# Patient Record
Sex: FEMALE | Employment: UNEMPLOYED | URBAN - METROPOLITAN AREA
[De-identification: names, ages, dates, MRNs, and addresses within clinical notes are randomized per-mention and may not be internally consistent; named-entity substitution may affect disease eponyms.]

---

## 2017-05-08 ENCOUNTER — IP HISTORICAL/CONVERTED ENCOUNTER (OUTPATIENT)
Dept: OTHER | Age: 72
End: 2017-05-08

## 2017-06-13 ENCOUNTER — ED HISTORICAL/CONVERTED ENCOUNTER (OUTPATIENT)
Dept: OTHER | Age: 72
End: 2017-06-13

## 2017-08-28 ENCOUNTER — APPOINTMENT (OUTPATIENT)
Dept: CT IMAGING | Age: 72
End: 2017-08-28
Attending: EMERGENCY MEDICINE
Payer: MEDICARE

## 2017-08-28 ENCOUNTER — APPOINTMENT (OUTPATIENT)
Dept: MRI IMAGING | Age: 72
End: 2017-08-28
Attending: INTERNAL MEDICINE
Payer: MEDICARE

## 2017-08-28 ENCOUNTER — HOSPITAL ENCOUNTER (OUTPATIENT)
Age: 72
Setting detail: OBSERVATION
Discharge: HOME | End: 2017-08-29
Attending: EMERGENCY MEDICINE | Admitting: INTERNAL MEDICINE
Payer: MEDICARE

## 2017-08-28 ENCOUNTER — APPOINTMENT (OUTPATIENT)
Dept: GENERAL RADIOLOGY | Age: 72
End: 2017-08-28
Attending: EMERGENCY MEDICINE
Payer: MEDICARE

## 2017-08-28 DIAGNOSIS — R51.9 NONINTRACTABLE HEADACHE, UNSPECIFIED CHRONICITY PATTERN, UNSPECIFIED HEADACHE TYPE: Primary | ICD-10-CM

## 2017-08-28 DIAGNOSIS — I15.9 SECONDARY HYPERTENSION: ICD-10-CM

## 2017-08-28 DIAGNOSIS — R20.0 NUMBNESS: ICD-10-CM

## 2017-08-28 PROBLEM — K21.9 GERD (GASTROESOPHAGEAL REFLUX DISEASE): Status: ACTIVE | Noted: 2017-08-28

## 2017-08-28 PROBLEM — F32.A DEPRESSION: Status: ACTIVE | Noted: 2017-08-28

## 2017-08-28 PROBLEM — G62.9 PERIPHERAL NEUROPATHY: Status: ACTIVE | Noted: 2017-08-28

## 2017-08-28 LAB
ALBUMIN SERPL-MCNC: 4.1 G/DL (ref 3.5–5)
ALBUMIN/GLOB SERPL: 1.1 {RATIO} (ref 1.1–2.2)
ALP SERPL-CCNC: 115 U/L (ref 45–117)
ALT SERPL-CCNC: 15 U/L (ref 12–78)
ANION GAP SERPL CALC-SCNC: 6 MMOL/L (ref 5–15)
APPEARANCE UR: CLEAR
AST SERPL-CCNC: 14 U/L (ref 15–37)
BACTERIA URNS QL MICRO: NEGATIVE /HPF
BASOPHILS # BLD: 0.1 K/UL (ref 0–0.1)
BASOPHILS NFR BLD: 1 % (ref 0–1)
BILIRUB SERPL-MCNC: 0.3 MG/DL (ref 0.2–1)
BILIRUB UR QL: NEGATIVE
BUN SERPL-MCNC: 11 MG/DL (ref 6–20)
BUN/CREAT SERPL: 13 (ref 12–20)
CALCIUM SERPL-MCNC: 9.1 MG/DL (ref 8.5–10.1)
CHLORIDE SERPL-SCNC: 104 MMOL/L (ref 97–108)
CK MB CFR SERPL CALC: 1.5 % (ref 0–2.5)
CK MB SERPL-MCNC: 2.5 NG/ML (ref 5–25)
CK SERPL-CCNC: 163 U/L (ref 26–192)
CO2 SERPL-SCNC: 30 MMOL/L (ref 21–32)
COLOR UR: NORMAL
CREAT SERPL-MCNC: 0.85 MG/DL (ref 0.55–1.02)
EOSINOPHIL # BLD: 0.3 K/UL (ref 0–0.4)
EOSINOPHIL NFR BLD: 4 % (ref 0–7)
EPITH CASTS URNS QL MICRO: NORMAL /LPF
ERYTHROCYTE [DISTWIDTH] IN BLOOD BY AUTOMATED COUNT: 14.3 % (ref 11.5–14.5)
ERYTHROCYTE [SEDIMENTATION RATE] IN BLOOD: 9 MM/HR (ref 0–30)
GLOBULIN SER CALC-MCNC: 3.6 G/DL (ref 2–4)
GLUCOSE BLD STRIP.AUTO-MCNC: 106 MG/DL (ref 65–100)
GLUCOSE SERPL-MCNC: 92 MG/DL (ref 65–100)
GLUCOSE UR STRIP.AUTO-MCNC: NEGATIVE MG/DL
HCT VFR BLD AUTO: 42.3 % (ref 35–47)
HGB BLD-MCNC: 13.8 G/DL (ref 11.5–16)
HGB UR QL STRIP: NEGATIVE
HYALINE CASTS URNS QL MICRO: NORMAL /LPF (ref 0–5)
INR PPP: 1 (ref 0.9–1.1)
KETONES UR QL STRIP.AUTO: NEGATIVE MG/DL
LEUKOCYTE ESTERASE UR QL STRIP.AUTO: NEGATIVE
LIPASE SERPL-CCNC: 236 U/L (ref 73–393)
LYMPHOCYTES # BLD: 2.6 K/UL (ref 0.8–3.5)
LYMPHOCYTES NFR BLD: 33 % (ref 12–49)
MCH RBC QN AUTO: 30.3 PG (ref 26–34)
MCHC RBC AUTO-ENTMCNC: 32.6 G/DL (ref 30–36.5)
MCV RBC AUTO: 92.8 FL (ref 80–99)
MONOCYTES # BLD: 0.6 K/UL (ref 0–1)
MONOCYTES NFR BLD: 7 % (ref 5–13)
NEUTS SEG # BLD: 4.5 K/UL (ref 1.8–8)
NEUTS SEG NFR BLD: 55 % (ref 32–75)
NITRITE UR QL STRIP.AUTO: NEGATIVE
PH UR STRIP: 7 [PH] (ref 5–8)
PLATELET # BLD AUTO: 263 K/UL (ref 150–400)
POTASSIUM SERPL-SCNC: 4.1 MMOL/L (ref 3.5–5.1)
PROT SERPL-MCNC: 7.7 G/DL (ref 6.4–8.2)
PROT UR STRIP-MCNC: NEGATIVE MG/DL
PROTHROMBIN TIME: 10.5 SEC (ref 9–11.1)
RBC # BLD AUTO: 4.56 M/UL (ref 3.8–5.2)
RBC #/AREA URNS HPF: NORMAL /HPF (ref 0–5)
SERVICE CMNT-IMP: ABNORMAL
SODIUM SERPL-SCNC: 140 MMOL/L (ref 136–145)
SP GR UR REFRACTOMETRY: 1 (ref 1–1.03)
TROPONIN I SERPL-MCNC: <0.04 NG/ML
UROBILINOGEN UR QL STRIP.AUTO: 0.2 EU/DL (ref 0.2–1)
WBC # BLD AUTO: 8 K/UL (ref 3.6–11)
WBC URNS QL MICRO: NORMAL /HPF (ref 0–4)

## 2017-08-28 PROCEDURE — 84484 ASSAY OF TROPONIN QUANT: CPT | Performed by: EMERGENCY MEDICINE

## 2017-08-28 PROCEDURE — 82550 ASSAY OF CK (CPK): CPT | Performed by: EMERGENCY MEDICINE

## 2017-08-28 PROCEDURE — 96374 THER/PROPH/DIAG INJ IV PUSH: CPT

## 2017-08-28 PROCEDURE — 96361 HYDRATE IV INFUSION ADD-ON: CPT

## 2017-08-28 PROCEDURE — 70544 MR ANGIOGRAPHY HEAD W/O DYE: CPT

## 2017-08-28 PROCEDURE — 99285 EMERGENCY DEPT VISIT HI MDM: CPT

## 2017-08-28 PROCEDURE — 74011250636 HC RX REV CODE- 250/636: Performed by: EMERGENCY MEDICINE

## 2017-08-28 PROCEDURE — 85610 PROTHROMBIN TIME: CPT | Performed by: EMERGENCY MEDICINE

## 2017-08-28 PROCEDURE — 85652 RBC SED RATE AUTOMATED: CPT | Performed by: EMERGENCY MEDICINE

## 2017-08-28 PROCEDURE — 70551 MRI BRAIN STEM W/O DYE: CPT

## 2017-08-28 PROCEDURE — 80053 COMPREHEN METABOLIC PANEL: CPT | Performed by: EMERGENCY MEDICINE

## 2017-08-28 PROCEDURE — 70450 CT HEAD/BRAIN W/O DYE: CPT

## 2017-08-28 PROCEDURE — 83690 ASSAY OF LIPASE: CPT | Performed by: EMERGENCY MEDICINE

## 2017-08-28 PROCEDURE — 93005 ELECTROCARDIOGRAM TRACING: CPT

## 2017-08-28 PROCEDURE — 99218 HC RM OBSERVATION: CPT

## 2017-08-28 PROCEDURE — 96375 TX/PRO/DX INJ NEW DRUG ADDON: CPT

## 2017-08-28 PROCEDURE — 36415 COLL VENOUS BLD VENIPUNCTURE: CPT | Performed by: EMERGENCY MEDICINE

## 2017-08-28 PROCEDURE — 85025 COMPLETE CBC W/AUTO DIFF WBC: CPT | Performed by: EMERGENCY MEDICINE

## 2017-08-28 PROCEDURE — 71010 XR CHEST PORT: CPT

## 2017-08-28 PROCEDURE — 81001 URINALYSIS AUTO W/SCOPE: CPT | Performed by: EMERGENCY MEDICINE

## 2017-08-28 PROCEDURE — 82962 GLUCOSE BLOOD TEST: CPT

## 2017-08-28 RX ORDER — BUTALBITAL, ACETAMINOPHEN AND CAFFEINE 50; 325; 40 MG/1; MG/1; MG/1
1 TABLET ORAL
COMMUNITY

## 2017-08-28 RX ORDER — SODIUM CHLORIDE 0.9 % (FLUSH) 0.9 %
5-10 SYRINGE (ML) INJECTION AS NEEDED
Status: DISCONTINUED | OUTPATIENT
Start: 2017-08-28 | End: 2017-08-29 | Stop reason: HOSPADM

## 2017-08-28 RX ORDER — PANTOPRAZOLE SODIUM 40 MG/1
40 TABLET, DELAYED RELEASE ORAL DAILY
Status: DISCONTINUED | OUTPATIENT
Start: 2017-08-29 | End: 2017-08-29 | Stop reason: HOSPADM

## 2017-08-28 RX ORDER — GUAIFENESIN 100 MG/5ML
81 LIQUID (ML) ORAL DAILY
Status: DISCONTINUED | OUTPATIENT
Start: 2017-08-29 | End: 2017-08-29 | Stop reason: HOSPADM

## 2017-08-28 RX ORDER — GABAPENTIN 300 MG/1
300 CAPSULE ORAL EVERY EVENING
COMMUNITY

## 2017-08-28 RX ORDER — KETOROLAC TROMETHAMINE 30 MG/ML
15 INJECTION, SOLUTION INTRAMUSCULAR; INTRAVENOUS
Status: COMPLETED | OUTPATIENT
Start: 2017-08-28 | End: 2017-08-28

## 2017-08-28 RX ORDER — ESCITALOPRAM OXALATE 5 MG/1
5 TABLET ORAL EVERY EVENING
COMMUNITY

## 2017-08-28 RX ORDER — ASPIRIN 81 MG/1
81 TABLET ORAL EVERY EVENING
COMMUNITY

## 2017-08-28 RX ORDER — ENOXAPARIN SODIUM 100 MG/ML
40 INJECTION SUBCUTANEOUS EVERY 24 HOURS
Status: DISCONTINUED | OUTPATIENT
Start: 2017-08-28 | End: 2017-08-29 | Stop reason: HOSPADM

## 2017-08-28 RX ORDER — PROCHLORPERAZINE EDISYLATE 5 MG/ML
10 INJECTION INTRAMUSCULAR; INTRAVENOUS
Status: COMPLETED | OUTPATIENT
Start: 2017-08-28 | End: 2017-08-28

## 2017-08-28 RX ORDER — DIPHENHYDRAMINE HYDROCHLORIDE 50 MG/ML
25 INJECTION, SOLUTION INTRAMUSCULAR; INTRAVENOUS
Status: COMPLETED | OUTPATIENT
Start: 2017-08-28 | End: 2017-08-28

## 2017-08-28 RX ORDER — OMEPRAZOLE 20 MG/1
20 CAPSULE, DELAYED RELEASE ORAL EVERY EVENING
Status: DISCONTINUED | OUTPATIENT
Start: 2017-08-29 | End: 2017-08-28 | Stop reason: CLARIF

## 2017-08-28 RX ORDER — AMOXICILLIN 250 MG
1 CAPSULE ORAL DAILY
Status: DISCONTINUED | OUTPATIENT
Start: 2017-08-29 | End: 2017-08-29 | Stop reason: HOSPADM

## 2017-08-28 RX ORDER — GABAPENTIN 300 MG/1
300 CAPSULE ORAL EVERY EVENING
Status: DISCONTINUED | OUTPATIENT
Start: 2017-08-29 | End: 2017-08-29 | Stop reason: HOSPADM

## 2017-08-28 RX ORDER — ONDANSETRON 2 MG/ML
4 INJECTION INTRAMUSCULAR; INTRAVENOUS
Status: DISCONTINUED | OUTPATIENT
Start: 2017-08-28 | End: 2017-08-29 | Stop reason: HOSPADM

## 2017-08-28 RX ORDER — SODIUM CHLORIDE 0.9 % (FLUSH) 0.9 %
5-10 SYRINGE (ML) INJECTION EVERY 8 HOURS
Status: DISCONTINUED | OUTPATIENT
Start: 2017-08-28 | End: 2017-08-29 | Stop reason: HOSPADM

## 2017-08-28 RX ORDER — ACETAMINOPHEN 650 MG/1
650 SUPPOSITORY RECTAL
Status: DISCONTINUED | OUTPATIENT
Start: 2017-08-28 | End: 2017-08-29 | Stop reason: HOSPADM

## 2017-08-28 RX ORDER — ESCITALOPRAM OXALATE 10 MG/1
5 TABLET ORAL EVERY EVENING
Status: DISCONTINUED | OUTPATIENT
Start: 2017-08-29 | End: 2017-08-29 | Stop reason: HOSPADM

## 2017-08-28 RX ORDER — OMEPRAZOLE 20 MG/1
20 CAPSULE, DELAYED RELEASE ORAL EVERY EVENING
COMMUNITY

## 2017-08-28 RX ORDER — DEXAMETHASONE SODIUM PHOSPHATE 4 MG/ML
10 INJECTION, SOLUTION INTRA-ARTICULAR; INTRALESIONAL; INTRAMUSCULAR; INTRAVENOUS; SOFT TISSUE
Status: COMPLETED | OUTPATIENT
Start: 2017-08-28 | End: 2017-08-28

## 2017-08-28 RX ORDER — ACETAMINOPHEN 325 MG/1
650 TABLET ORAL
Status: DISCONTINUED | OUTPATIENT
Start: 2017-08-28 | End: 2017-08-29 | Stop reason: HOSPADM

## 2017-08-28 RX ORDER — KETOROLAC TROMETHAMINE 30 MG/ML
15 INJECTION, SOLUTION INTRAMUSCULAR; INTRAVENOUS
Status: DISCONTINUED | OUTPATIENT
Start: 2017-08-28 | End: 2017-08-29 | Stop reason: HOSPADM

## 2017-08-28 RX ADMIN — SODIUM CHLORIDE 1000 ML: 900 INJECTION, SOLUTION INTRAVENOUS at 18:28

## 2017-08-28 RX ADMIN — PROCHLORPERAZINE EDISYLATE 10 MG: 5 INJECTION INTRAMUSCULAR; INTRAVENOUS at 18:25

## 2017-08-28 RX ADMIN — DIPHENHYDRAMINE HYDROCHLORIDE 25 MG: 50 INJECTION, SOLUTION INTRAMUSCULAR; INTRAVENOUS at 18:25

## 2017-08-28 RX ADMIN — KETOROLAC TROMETHAMINE 15 MG: 30 INJECTION, SOLUTION INTRAMUSCULAR at 18:22

## 2017-08-28 RX ADMIN — DEXAMETHASONE SODIUM PHOSPHATE 10 MG: 4 INJECTION, SOLUTION INTRAMUSCULAR; INTRAVENOUS at 18:26

## 2017-08-28 NOTE — PROGRESS NOTES
[facility]: MED RECONCILIATION      Information obtained from: Patient       Allergies: Tetanus vaccines and toxoid    Prior to Admission Medications:     Medication Documentation Review Audit       Reviewed by [address], PHARMD (Pharmacist) on 08/28/17 at [address] [place] [address] Provider Last Dose Status Taking?      aspirin delayed-release 81 mg tablet Take 81 mg by mouth every evening. Historical Provider 8/27/2017 Active Yes    butalbital-acetaminophen-caffeine (FIORICET, ESGIC) -40 mg per tablet Take 1 Tab by mouth every six (6) hours as needed for Headache. Historical Provider 8/27/2017 Active Yes    escitalopram oxalate (LEXAPRO) 5 mg tablet Take 5 mg by mouth every evening. Historical Provider 8/27/2017 Active Yes    fish oil-omega-3 fatty acids (FISH OIL) 340-1,000 mg capsule Take 1 Cap by mouth every evening. Historical Provider 8/27/2017 Active Yes    gabapentin (NEURONTIN) 300 mg capsule Take 300 mg by mouth every evening. Historical Provider 8/27/2017 Active Yes    omeprazole (PRILOSEC) 20 mg capsule Take 20 mg by mouth every evening.  Historical Provider 8/27/2017 Active Yes                        [address], PHARMD   Contact: [phone]

## 2017-08-28 NOTE — IP AVS SNAPSHOT
303 [facility] 
 
 
 5[address] [facility] 70 [facility] 
[phone] Patient: Vin Melvin MRN: [id number] :1945 Current Discharge Medication List  
  
START taking these medications Dose & Instructions Dispensing Information Comments Morning Noon Evening Bedtime  
 atorvastatin 40 mg tablet Commonly known as:  LIPITOR Your last dose was: Your next dose is:    
   
   
 Dose:  40 mg Take 1 Tab by mouth daily for 90 days. Quantity:  30 Tab Refills:  2  
     
   
   
   
  
 predniSONE 10 mg tablet Commonly known as:  Izzy Ada Your last dose was: Your next dose is: Take 6 tabs for 1 day, then 5 tabs for 1 day, then 4 tabs for 1 day, then 3 tabs for 1 day, then 2 tabs for 1 day, then 1 tab for 1 day. Quantity:  21 Tab Refills:  0 CONTINUE these medications which have NOT CHANGED Dose & Instructions Dispensing Information Comments Morning Noon Evening Bedtime  
 aspirin delayed-release 81 mg tablet Your last dose was: Your next dose is:    
   
   
 Dose:  81 mg Take 81 mg by mouth every evening. Refills:  0  
     
   
   
   
  
 butalbital-acetaminophen-caffeine -40 mg per tablet Commonly known as:  Catherne Saras Your last dose was: Your next dose is:    
   
   
 Dose:  1 Tab Take 1 Tab by mouth every six (6) hours as needed for Headache. Refills:  0  
     
   
   
   
  
 FISH -1,000 mg capsule Generic drug:  fish oil-omega-3 fatty acids Your last dose was: Your next dose is:    
   
   
 Dose:  1 Cap Take 1 Cap by mouth every evening. Refills:  0  
     
   
   
   
  
 gabapentin 300 mg capsule Commonly known as:  NEURONTIN Your last dose was: Your next dose is:    
   
   
 Dose:  300 mg Take 300 mg by mouth every evening. Refills:  0 LEXAPRO 5 mg tablet Generic drug:  escitalopram oxalate Your last dose was: Your next dose is:    
   
   
 Dose:  5 mg Take 5 mg by mouth every evening. Refills:  0  
     
   
   
   
  
 omeprazole 20 mg capsule Commonly known as:  PRILOSEC Your last dose was: Your next dose is:    
   
   
 Dose:  20 mg Take 20 mg by mouth every evening. Refills:  0 Where to Get Your Medications Information on where to get these meds will be given to you by the nurse or doctor. ! Ask your nurse or doctor about these medications  
  atorvastatin 40 mg tablet  
 predniSONE 10 mg tablet

## 2017-08-28 NOTE — ED NOTES
Bedside and Verbal shift change report given to Lor Conrad RN (oncoming nurse) by Arleth Gonzalez RN (offgoing nurse). Report included the following information SBAR, ED Summary, MAR and Recent Results.

## 2017-08-28 NOTE — IP AVS SNAPSHOT
[address] 
[phone] Patient: Corby Pinzon MRN: [id number] :1945 You are allergic to the following Allergen Reactions Tetanus Vaccines And Toxoid Swelling \"Whole arm swelled up\". Reaction happened years ago per patient. Recent Documentation Height Weight BMI  
  
  
 1.575 m 63.5 kg 25.61 kg/m2 Emergency Contacts Name Discharge Info Relation Home Work Mobile Gera Motley DISCHARGE CAREGIVER [3] Spouse [3] [phone] About your hospitalization You were admitted on:  2017 You last received care in the:  [facility] 5M1 MED SURG 1 You were discharged on:  2017 Unit phone number:  [phone] Why you were hospitalized Your primary diagnosis was:  Cervicalgia Your diagnoses also included:  Facial Numbness, Gerd (Gastroesophageal Reflux Disease), Peripheral Neuropathy (Hcc), Depression, Hyperlipidemia, Neuropathy (Hcc), Radiculopathy Providers Seen During Your Hospitalizations Provider Role Specialty Primary office phone Shante Ladd MD Attending Provider Emergency Medicine [phone] Emmanuelle Hernandez DO Attending Provider Internal Medicine [phone] Duyen Espinosa MD Attending Provider Internal Medicine [phone] Your Primary Care Physician (PCP) Primary Care Physician [facility] [phone] Follow-up Information Follow up With Details Comments Contact Info Lidia  MD Jameson Schedule an appointment as soon as possible for a visit in 3 weeks  [address] [place] [address] 
[phone] Charlene Saini NP Schedule an appointment as soon as possible for a visit in 2 weeks If symptoms worsen, with Neurology NP if CHEEMA continues.   Press option 4 on telephone to schedule appt, state you are a hospital follow-up pt and have insurance card to schedule appt [address] [place] [address] 
[phone] Current Discharge Medication List  
  
START taking these medications Dose & Instructions Dispensing Information Comments Morning Noon Evening Bedtime  
 atorvastatin 40 mg tablet Commonly known as:  LIPITOR Your last dose was: Your next dose is:    
   
   
 Dose:  40 mg Take 1 Tab by mouth daily for 90 days. Quantity:  30 Tab Refills:  2  
     
   
   
   
  
 predniSONE 10 mg tablet Commonly known as:  Jen Humcherisebird Your last dose was: Your next dose is: Take 6 tabs for 1 day, then 5 tabs for 1 day, then 4 tabs for 1 day, then 3 tabs for 1 day, then 2 tabs for 1 day, then 1 tab for 1 day. Quantity:  21 Tab Refills:  0 CONTINUE these medications which have NOT CHANGED Dose & Instructions Dispensing Information Comments Morning Noon Evening Bedtime  
 aspirin delayed-release 81 mg tablet Your last dose was: Your next dose is:    
   
   
 Dose:  81 mg Take 81 mg by mouth every evening. Refills:  0  
     
   
   
   
  
 butalbital-acetaminophen-caffeine -40 mg per tablet Commonly known as:  Vernette Agent Your last dose was: Your next dose is:    
   
   
 Dose:  1 Tab Take 1 Tab by mouth every six (6) hours as needed for Headache. Refills:  0  
     
   
   
   
  
 FISH -1,000 mg capsule Generic drug:  fish oil-omega-3 fatty acids Your last dose was: Your next dose is:    
   
   
 Dose:  1 Cap Take 1 Cap by mouth every evening. Refills:  0  
     
   
   
   
  
 gabapentin 300 mg capsule Commonly known as:  NEURONTIN Your last dose was: Your next dose is:    
   
   
 Dose:  300 mg Take 300 mg by mouth every evening. Refills:  0 LEXAPRO 5 mg tablet Generic drug:  escitalopram oxalate Your last dose was: Your next dose is:    
   
   
 Dose:  5 mg Take 5 mg by mouth every evening. Refills:  0  
     
   
   
   
  
 omeprazole 20 mg capsule Commonly known as:  PRILOSEC Your last dose was: Your next dose is:    
   
   
 Dose:  20 mg Take 20 mg by mouth every evening. Refills:  0 Where to Get Your Medications Information on where to get these meds will be given to you by the nurse or doctor. ! Ask your nurse or doctor about these medications  
  atorvastatin 40 mg tablet  
 predniSONE 10 mg tablet Discharge Instructions Patient Discharge Instructions Donna Hinojosadra / [id number] : 1945 Admitted 2017 Discharged: 2017 Primary Diagnoses Problem List as of 2017  Date Reviewed: 2017 Codes Class Noted - Resolved Hyperlipidemia Neuropathy ([facility] * (Principal)Cervicalgia Radiculopathy Facial numbness GERD (gastroesophageal reflux disease) Peripheral neuropathy ([facility] Depression Take Home Medications · It is important that you take the medication exactly as they are prescribed. · Keep your medication in the bottles provided by the pharmacist and keep a list of the medication names, dosages, and times to be taken in your wallet. · Do not take other medications without consulting your doctor. What to do at [facility] Recommended diet: Cardiac Diet and Low fat, Low cholesterol Recommended activity: Activity as tolerated If you experience worse symptoms, please follow up with your PCP or neurologist. 
 
Follow-up with your PCP in a few weeks Cholesterol and Triglycerides Tests: About These Tests What are they? Cholesterol and triglycerides tests measure the amount of fats in your blood. These fats have both \"good\" (HDL) and \"bad\" (LDL) cholesterol. Why are these tests done? These tests are done to help find out your risk of a heart attack and stroke. They can help your doctor find out how well medicine is working for some health problems. How can you prepare for these tests? · Your doctor may tell you to fast before your tests. This means that you do not eat or drink anything except water for 9 to 12 hours before the tests. In most cases, you can take your medicines with water the morning of the test. 
· Do not eat high-fat foods the night before the tests. · Do not drink alcohol or do intense exercise the night before the tests. · Be sure to tell your doctor about all the over-the-counter and prescription medicines and herbs or other supplements you take. They can affect the results of these tests. What happens during these tests? A health professional takes a sample of your blood. What else should you know about these tests? Your cholesterol levels can help your doctor find out your risk for having a heart attack or stroke. But it's not just about your cholesterol. Your doctor uses your cholesterol levels plus other things to calculate your risk. These include: 
· Your blood pressure. · Whether or not you have diabetes. · Your age, sex, and race. · Whether or not you smoke. You and your doctor can talk about whether you need to lower your risk and what treatment is best for you. Where can you learn more? Go to http://blanca-tristen.info/. Enter I387 in the search box to learn more about \"Cholesterol and Triglycerides Tests: About These Tests. \" Current as of: April 3, 2017 Content Version: 11.3 © 8558-4346 [employer]. Care instructions adapted under license by [employer] (which disclaims liability or warranty for this information).  If you have questions about a medical condition or this instruction, always ask your healthcare professional. [employer] disclaims any warranty or liability for your use of this information. Information obtained by : 
I understand that if any problems occur once I am at home I am to contact my physician. I understand and acknowledge receipt of the instructions indicated above. Physician's or R.N.'s Signature                                                                  Date/Time Patient or Representative Signature                                                          Date/Time Discharge Orders None [employer] Announcement We are excited to announce that we are making your provider's discharge notes available to you in [employer]. You will see these notes when they are completed and signed by the physician that discharged you from your recent hospital stay. If you have any questions or concerns about any information you see in [employer], please call the Health Information Department where you were seen or reach out to your Primary Care Provider for more information about your plan of care. Introducing [facility] SERVICES! Yon Fam introduces [employer] patient portal. Now you can access parts of your medical record, email your doctor's office, and request medication refills online. 1. In your internet browser, go to [email or web address] 2. Click on the First Time User? Click Here link in the Sign In box. You will see the New Member Sign Up page. 3. Enter your [employer] Access Code exactly as it appears below. You will not need to use this code after youve completed the sign-up process. If you do not sign up before the expiration date, you must request a new code. · [employer] Access Code: [email or web address] Expires: 11/27/2017  3:44 PM 
 
4. Enter the last four digits of your Social Security Number (xxxx) and Date of Birth (mm/dd/yyyy) as indicated and click Submit. You will be taken to the next sign-up page. 5. Create a [employer] ID. This will be your [employer] login ID and cannot be changed, so think of one that is secure and easy to remember. 6. Create a [employer] password. You can change your password at any time. 7. Enter your Password Reset Question and Answer. This can be used at a later time if you forget your password. 8. Enter your e-mail address. You will receive e-mail notification when new information is available in [address] 9. Click Sign Up. You can now view and download portions of your medical record. 10. Click the Download Summary menu link to download a portable copy of your medical information. If you have questions, please visit the Frequently Asked Questions section of the [employer] website. Remember, [employer] is NOT to be used for urgent needs. For medical emergencies, dial 911. Now available from your iPhone and Android! General Information Please provide this summary of care documentation to your next provider. Patient Signature:  ____________________________________________________________ Date:  ____________________________________________________________  
  
Claudene Born Provider Signature:  ____________________________________________________________ Date:  ____________________________________________________________

## 2017-08-28 NOTE — ED PROVIDER NOTES
HPI Comments: 67 y.o. female with no significant past medical history who presents from home via private vehilce with chief complaint of HA and facial numbness. Pt reports that she has had daily headaches for the past month that gradually have worsened in severity over the past 2 days and then today around 3:45 she developed diffuse facial numbness that prompted her to come to the ED. She has not taken any medications to day for her Sx. Pt denies focal weakness, vision changes, speech changes, facial asymmetry, fever, chills, nausea, vomiting, diarrhea, abdominal pain, CP, SOB. There are no other acute medical concerns at this time. PCP: Ninoska Sanchez MD  Note written by hetal Del Toro, as dictated by José Busby MD 6:47 PM        The history is provided by the patient. No past medical history on file. No past surgical history on file. No family history on file. Social History     Social History    Marital status: N/A     Spouse name: N/A    Number of children: N/A    Years of education: N/A     Occupational History    Not on file. Social History Main Topics    Smoking status: Not on file    Smokeless tobacco: Not on file    Alcohol use Not on file    Drug use: Not on file    Sexual activity: Not on file     Other Topics Concern    Not on file     Social History Narrative         ALLERGIES: Review of patient's allergies indicates no known allergies. Review of Systems   Constitutional: Negative for chills and fever. HENT: Negative for congestion, rhinorrhea, sneezing and sore throat. Eyes: Negative for redness and visual disturbance. Respiratory: Negative for shortness of breath. Cardiovascular: Negative for chest pain and leg swelling. Gastrointestinal: Negative for abdominal pain, nausea and vomiting. Genitourinary: Negative for difficulty urinating and frequency. Musculoskeletal: Negative for back pain, myalgias and neck stiffness.    Skin: Negative for rash. Neurological: Positive for numbness and headaches. Negative for dizziness, syncope and weakness. Hematological: Negative for adenopathy. All other systems reviewed and are negative. Vitals:    08/28/17 1739   BP: (!) 204/92   Pulse: 60   Resp: 16   Temp: 98.2 °F (36.8 °C)   SpO2: 97%   Weight: 63.5 kg (140 lb)   Height: 5' 2\" (1.575 m)            Physical Exam   Constitutional: She is oriented to person, place, and time. She appears well-developed and well-nourished. NAD, AxOx4, speaking in complete sentences, gcs = 15     HENT:   Head: Normocephalic and atraumatic. Nose: Nose normal.   Mouth/Throat: Oropharynx is clear and moist.   Cn intact    No facial droop/ slurred speech/ tongue deviation    'cheeks are numb (bilat)'   Eyes: Conjunctivae and EOM are normal. Pupils are equal, round, and reactive to light. Right eye exhibits no discharge. Left eye exhibits no discharge. No scleral icterus. Neck: Normal range of motion. Neck supple. No JVD present. No tracheal deviation present. Cardiovascular: Normal rate, regular rhythm, normal heart sounds and intact distal pulses. Exam reveals no gallop and no friction rub. No murmur heard. Pulmonary/Chest: Effort normal and breath sounds normal. No respiratory distress. She has no wheezes. She has no rales. She exhibits no tenderness. Abdominal: Soft. Bowel sounds are normal. There is no tenderness. There is no rebound and no guarding. nttp     Genitourinary: No vaginal discharge found. Genitourinary Comments: Pt denies urinary/ vaginal complaints   Musculoskeletal: Normal range of motion. She exhibits no edema, tenderness or deformity. Neurological: She is alert and oriented to person, place, and time. She has normal reflexes. No cranial nerve deficit. She exhibits normal muscle tone. Coordination normal.   pt has motor/ CV/ Sensation grossly intact to all extremities, R = L in strength;   Skin: Skin is warm and dry.  No rash noted. No erythema. No pallor. Psychiatric: She has a normal mood and affect. Her behavior is normal. Thought content normal.   Nursing note and vitals reviewed. MDM  ED Course       Procedures     Chief Complaint   Patient presents with    Headache    Numbness       7:23 PM  The patients presenting problems have been discussed, and they are in agreement with the care plan formulated and outlined with them. I have encouraged them to ask questions as they arise throughout their visit. MEDICATIONS GIVEN:  Medications   sodium chloride 0.9 % bolus infusion 1,000 mL (1,000 mL IntraVENous New Bag 8/28/17 1828)   ketorolac (TORADOL) injection 15 mg (15 mg IntraVENous Given 8/28/17 1822)   prochlorperazine (COMPAZINE) injection 10 mg (10 mg IntraVENous Given 8/28/17 1825)   diphenhydrAMINE (BENADRYL) injection 25 mg (25 mg IntraVENous Given 8/28/17 1825)   dexamethasone (DECADRON) 4 mg/mL injection 10 mg (10 mg IntraVENous Given 8/28/17 1826)       LABS REVIEWED:  Labs Reviewed   METABOLIC PANEL, COMPREHENSIVE - Abnormal; Notable for the following:        Result Value    AST (SGOT) 14 (*)     All other components within normal limits   GLUCOSE, POC - Abnormal; Notable for the following:     Glucose (POC) 106 (*)     All other components within normal limits   TROPONIN I   PROTHROMBIN TIME + INR   URINALYSIS W/MICROSCOPIC   LIPASE   CK W/ CKMB & INDEX   CBC WITH AUTOMATED DIFF   SED RATE (ESR)   SAMPLES BEING HELD   POC GLUCOSE       RADIOLOGY RESULTS:  The following have been ordered and reviewed:  _____________________________________________________________________  _____________________________________________________________________    EKG interpretation: (Preliminary)  Rhythm: normal sinus rhythm; and regular .  Rate (approx.): 62; Axis: normal; P wave: normal; QRS interval: normal ; ST/T wave: normal; Negative acute significant segmental elevations    PROCEDURES:        CONSULTATIONS:       PROGRESS NOTES:      DIAGNOSIS:    1. Nonintractable headache, unspecified chronicity pattern, unspecified headache type    2. Numbness        PLAN:  1- HA/ facial numbness/ HA improved; plan to have evaluated for admission and obtain MRI/ MRA brain; Pt/  agrees;       ED COURSE: The patients hospital course has been uncomplicated. CONSULT NOTE:  5:52 PM Homar Moreno MD spoke with Dr. Jackelin Park, Consult for Neurology. Discussed available diagnostic tests and clinical findings. He is in agreement with care plans as outlined. Dr. Jackelin Park recommends MRA/MRI of the head and to obtain a sed rate. 7:24 PM  'doing better now';       7:30 PM  Patient is being evaluated for admission to the hospital by the hospitalist, Dr Amara Bruno . The results of their tests and reasons for their admission have been discussed with them and/or available family. They convey agreement and understanding for the need to be admitted and for their admission diagnosis. Consultation has been made with the inpatient physician specialist for hospitalization.

## 2017-08-28 NOTE — ED TRIAGE NOTES
Pt states facial numbness since 1530 today. Both cheeks feel numb. Pt speech is clear. No facial droop noted. Moves x 4 with equal hand grasps. C/o top of head aches.

## 2017-08-29 VITALS
SYSTOLIC BLOOD PRESSURE: 137 MMHG | WEIGHT: 140 LBS | OXYGEN SATURATION: 95 % | BODY MASS INDEX: 25.76 KG/M2 | HEART RATE: 58 BPM | HEIGHT: 62 IN | TEMPERATURE: 98 F | DIASTOLIC BLOOD PRESSURE: 80 MMHG | RESPIRATION RATE: 18 BRPM

## 2017-08-29 PROBLEM — M54.10 RADICULOPATHY: Status: ACTIVE | Noted: 2017-08-29

## 2017-08-29 PROBLEM — M54.2 CERVICALGIA: Status: ACTIVE | Noted: 2017-08-29

## 2017-08-29 LAB
ATRIAL RATE: 61 BPM
CALCULATED P AXIS, ECG09: 74 DEGREES
CALCULATED R AXIS, ECG10: 3 DEGREES
CALCULATED T AXIS, ECG11: 61 DEGREES
CHOLEST SERPL-MCNC: 294 MG/DL
DIAGNOSIS, 93000: NORMAL
EST. AVERAGE GLUCOSE BLD GHB EST-MCNC: 117 MG/DL
HBA1C MFR BLD: 5.7 % (ref 4.2–6.3)
HDLC SERPL-MCNC: 55 MG/DL
HDLC SERPL: 5.3 {RATIO} (ref 0–5)
LDLC SERPL CALC-MCNC: 222.8 MG/DL (ref 0–100)
LIPID PROFILE,FLP: ABNORMAL
P-R INTERVAL, ECG05: 138 MS
Q-T INTERVAL, ECG07: 444 MS
QRS DURATION, ECG06: 74 MS
QTC CALCULATION (BEZET), ECG08: 446 MS
T4 FREE SERPL-MCNC: 0.9 NG/DL (ref 0.8–1.5)
TRIGL SERPL-MCNC: 81 MG/DL (ref ?–150)
TSH SERPL DL<=0.05 MIU/L-ACNC: 0.84 UIU/ML (ref 0.36–3.74)
VENTRICULAR RATE, ECG03: 61 BPM
VLDLC SERPL CALC-MCNC: 16.2 MG/DL

## 2017-08-29 PROCEDURE — 80061 LIPID PANEL: CPT | Performed by: INTERNAL MEDICINE

## 2017-08-29 PROCEDURE — 74011000250 HC RX REV CODE- 250: Performed by: NURSE PRACTITIONER

## 2017-08-29 PROCEDURE — 74011636637 HC RX REV CODE- 636/637: Performed by: NURSE PRACTITIONER

## 2017-08-29 PROCEDURE — 93306 TTE W/DOPPLER COMPLETE: CPT

## 2017-08-29 PROCEDURE — 96372 THER/PROPH/DIAG INJ SC/IM: CPT

## 2017-08-29 PROCEDURE — 96365 THER/PROPH/DIAG IV INF INIT: CPT

## 2017-08-29 PROCEDURE — 74011250637 HC RX REV CODE- 250/637: Performed by: INTERNAL MEDICINE

## 2017-08-29 PROCEDURE — 84443 ASSAY THYROID STIM HORMONE: CPT | Performed by: INTERNAL MEDICINE

## 2017-08-29 PROCEDURE — 83036 HEMOGLOBIN GLYCOSYLATED A1C: CPT | Performed by: INTERNAL MEDICINE

## 2017-08-29 PROCEDURE — 36415 COLL VENOUS BLD VENIPUNCTURE: CPT | Performed by: INTERNAL MEDICINE

## 2017-08-29 PROCEDURE — 84439 ASSAY OF FREE THYROXINE: CPT | Performed by: INTERNAL MEDICINE

## 2017-08-29 PROCEDURE — 74011000258 HC RX REV CODE- 258: Performed by: NURSE PRACTITIONER

## 2017-08-29 PROCEDURE — 99218 HC RM OBSERVATION: CPT

## 2017-08-29 PROCEDURE — 74011250636 HC RX REV CODE- 250/636: Performed by: INTERNAL MEDICINE

## 2017-08-29 PROCEDURE — A9270 NON-COVERED ITEM OR SERVICE: HCPCS | Performed by: NURSE PRACTITIONER

## 2017-08-29 RX ORDER — PREDNISONE 20 MG/1
40 TABLET ORAL
Status: DISCONTINUED | OUTPATIENT
Start: 2017-08-31 | End: 2017-08-29 | Stop reason: HOSPADM

## 2017-08-29 RX ORDER — ATORVASTATIN CALCIUM 20 MG/1
40 TABLET, FILM COATED ORAL DAILY
Status: DISCONTINUED | OUTPATIENT
Start: 2017-08-29 | End: 2017-08-29 | Stop reason: HOSPADM

## 2017-08-29 RX ORDER — PREDNISONE 20 MG/1
20 TABLET ORAL
Status: DISCONTINUED | OUTPATIENT
Start: 2017-09-02 | End: 2017-08-29 | Stop reason: HOSPADM

## 2017-08-29 RX ORDER — PREDNISONE 10 MG/1
TABLET ORAL
Qty: 21 TAB | Refills: 0 | Status: SHIPPED | OUTPATIENT
Start: 2017-08-29

## 2017-08-29 RX ORDER — ATORVASTATIN CALCIUM 40 MG/1
40 TABLET, FILM COATED ORAL DAILY
Qty: 30 TAB | Refills: 2 | Status: SHIPPED | OUTPATIENT
Start: 2017-08-29 | End: 2017-11-27

## 2017-08-29 RX ORDER — PREDNISONE 20 MG/1
60 TABLET ORAL
Status: COMPLETED | OUTPATIENT
Start: 2017-08-29 | End: 2017-08-29

## 2017-08-29 RX ORDER — PREDNISONE 5 MG/1
10 TABLET ORAL
Status: DISCONTINUED | OUTPATIENT
Start: 2017-09-03 | End: 2017-08-29 | Stop reason: HOSPADM

## 2017-08-29 RX ADMIN — ATORVASTATIN CALCIUM 40 MG: 20 TABLET, FILM COATED ORAL at 11:49

## 2017-08-29 RX ADMIN — DOCUSATE SODIUM -SENNOSIDES 1 TABLET: 50; 8.6 TABLET, COATED ORAL at 08:36

## 2017-08-29 RX ADMIN — Medication 10 ML: at 01:18

## 2017-08-29 RX ADMIN — PANTOPRAZOLE SODIUM 40 MG: 40 TABLET, DELAYED RELEASE ORAL at 08:36

## 2017-08-29 RX ADMIN — PREDNISONE 60 MG: 20 TABLET ORAL at 11:49

## 2017-08-29 RX ADMIN — ENOXAPARIN SODIUM 40 MG: 40 INJECTION SUBCUTANEOUS at 01:17

## 2017-08-29 RX ADMIN — Medication 10 ML: at 06:22

## 2017-08-29 RX ADMIN — VALPROATE SODIUM 500 MG: 100 INJECTION, SOLUTION INTRAVENOUS at 11:49

## 2017-08-29 RX ADMIN — ASPIRIN 81 MG 81 MG: 81 TABLET ORAL at 08:36

## 2017-08-29 RX ADMIN — ACETAMINOPHEN 650 MG: 325 TABLET ORAL at 08:35

## 2017-08-29 NOTE — PROGRESS NOTES
TRANSFER - IN REPORT:    Verbal report received from Jesse Chand (name) on Vin Melvin  being received from ED(unit) for routine progression of care      Report consisted of patients Situation, Background, Assessment and   Recommendations(SBAR). Information from the following report(s) SBAR and Kardex was reviewed with the receiving nurse. Opportunity for questions and clarification was provided. Assessment completed upon patients arrival to unit and care assumed. Primary Nurse Abdias Duarte RN and [facility] performed a dual skin assessment on this patient No impairment noted  Aleksander score is 22      Stroke Education provided to patient and the following topics were discussed    1. Patients personal risk factors for stroke are none    2. Warning signs of Stroke:        * Sudden numbness or weakness of the face, arm or leg, especially on one side of          The body            * Sudden confusion, trouble speaking or understanding        * Sudden trouble seeing in one or both eyes        * Sudden trouble walking, dizziness, loss of balance or coordination        * Sudden severe headache with no known cause      3. Importance of activation Emergency Medical Services ( 9-1-1 ) immediately if experience any warning signs of stroke. 4. Be sure and schedule a follow-up appointment with your primary care doctor or any specialists as instructed. 5. You must take medicine every day to treat your risk factors for stroke. Be sure to take your medicines exactly as your doctor tells you: no more, no less. Know what your medicines are for , what they do. Anti-thrombotics /anticoagulants can help prevent strokes. You are taking the following medicine(s)  none  6. Smoking and second-hand smoke greatly increase your risk of stroke, cardiovascular disease and death. Smoking history never    7. Information provided was [facility] Stroke Education Binder    8.  Documentation of teaching completed in Patient Education Activity and on Care Plan with teaching response noted? yes      Bedside shift change report given to Nisa Mitchell RN (oncoming nurse) by Travon Wang RN (offgoing nurse). Report included the following information SBAR and Kardex.

## 2017-08-29 NOTE — DISCHARGE INSTRUCTIONS
Patient Discharge Instructions    Artie Spencer / [id number] : 1945    Admitted 2017 Discharged: 2017     Primary Diagnoses  Problem List as of 2017  Date Reviewed: 2017          Codes Class Noted - Resolved   Hyperlipidemia   Neuropathy ([facility]   * (Principal)Cervicalgia   Radiculopathy   Facial numbness   GERD (gastroesophageal reflux disease)   Peripheral neuropathy ([facility]   Depression          Take Home Medications     · It is important that you take the medication exactly as they are prescribed. · Keep your medication in the bottles provided by the pharmacist and keep a list of the medication names, dosages, and times to be taken in your wallet. · Do not take other medications without consulting your doctor. What to do at Home    Recommended diet: Cardiac Diet and Low fat, Low cholesterol    Recommended activity: Activity as tolerated    If you experience worse symptoms, please follow up with your PCP or neurologist.    Follow-up with your PCP in a few weeks    Cholesterol and Triglycerides Tests: About These Tests  What are they? Cholesterol and triglycerides tests measure the amount of fats in your blood. These fats have both \"good\" (HDL) and \"bad\" (LDL) cholesterol. Why are these tests done? These tests are done to help find out your risk of a heart attack and stroke. They can help your doctor find out how well medicine is working for some health problems. How can you prepare for these tests? · Your doctor may tell you to fast before your tests. This means that you do not eat or drink anything except water for 9 to 12 hours before the tests. In most cases, you can take your medicines with water the morning of the test.  · Do not eat high-fat foods the night before the tests. · Do not drink alcohol or do intense exercise the night before the tests.   · Be sure to tell your doctor about all the over-the-counter and prescription medicines and herbs or other supplements you take. They can affect the results of these tests. What happens during these tests? A health professional takes a sample of your blood. What else should you know about these tests? Your cholesterol levels can help your doctor find out your risk for having a heart attack or stroke. But it's not just about your cholesterol. Your doctor uses your cholesterol levels plus other things to calculate your risk. These include:  · Your blood pressure. · Whether or not you have diabetes. · Your age, sex, and race. · Whether or not you smoke. You and your doctor can talk about whether you need to lower your risk and what treatment is best for you. Where can you learn more? Go to http://blanca-tristen.info/. Enter W746 in the search box to learn more about \"Cholesterol and Triglycerides Tests: About These Tests. \"  Current as of: April 3, 2017  Content Version: 11.3  © 8951-9848 [place], [employer]. Care instructions adapted under license by [employer] (which disclaims liability or warranty for this information). If you have questions about a medical condition or this instruction, always ask your healthcare professional. [place] [address] any warranty or liability for your use of this information. Information obtained by :  I understand that if any problems occur once I am at home I am to contact my physician. I understand and acknowledge receipt of the instructions indicated above.                                                                                                                                            Physician's or R.N.'s Signature                                                                  Date/Time                                                                                                                                              Patient or Representative Signature Date/Time

## 2017-08-29 NOTE — CONSULTS
[facility]  [address] [facility]  [phone]     Inpatient Neurology Consult  Jaylene Romberg, [facility]    Name:   Clayton Garces   Medical record #: [id number]  Admission Date: 8/28/2017  Consult Date:  08/29/17    Referring Provider: Dr. Caden Anguiano  Chief Complaint:  Headache with facial paresthesias  Source of Hx:  Chart, pt  _____________________________________________________________________    HISTORY OF PRESENT ILLNESS:   Subjective  Clayton Garces is a 67 y.o. female with PMH of GERD, headache and neuropathy. The Neurology Service is asked to evaluate for headache, after admission for headache and facial numbness. Pt reports an 4-5 week history of headache, bi-frontal with sensation of sharp pain, rated 7/10. When sitting still had no pain, but when she coughed she had significant pain and when bending over the pain restarted with a vengeance. States her ADL's have reduced due to the pain. Tried Tylenol, Ibuprofen and Fiorcet without relief. Chose not to take the previous meds since they weren't helping. No photo/phonosensitivity, NV, confusion, weakness or visual changes with HA. Past Medical History:   Diagnosis Date    GERD (gastroesophageal reflux disease)     Headache      No past surgical history on file. No family history on file. Social History     Social History    Marital status:      Spouse name: N/A    Number of children: N/A    Years of education: N/A     Occupational History    Not on file. Social History Main Topics    Smoking status: Not on file    Smokeless tobacco: Not on file    Alcohol use Not on file    Drug use: Not on file    Sexual activity: Not on file     Other Topics Concern    Not on file     Social History Narrative    No narrative on file       Objective  Allergies: Allergies   Allergen Reactions    Tetanus Vaccines And Toxoid Swelling     \"Whole arm swelled up\". Reaction happened years ago per patient. Outpatient Meds  No current facility-administered medications on file prior to encounter. No current outpatient prescriptions on file prior to encounter.        Inpatient Meds    Current Facility-Administered Medications:     sodium chloride (NS) flush 5-10 mL, 5-10 mL, IntraVENous, Q8H, Lotus Lunsford Jr V, DO, 10 mL at 08/29/17 0622    sodium chloride (NS) flush 5-10 mL, 5-10 mL, IntraVENous, PRN, Lotus Lunsford Jr V, DO    ondansetron [facility]) injection 4 mg, 4 mg, IntraVENous, Q6H PRN, Lotus Lunsford Jr V, DO    senna-docusate (PERICOLACE) 8.6-50 mg per tablet 1 Tab, 1 Tab, Oral, DAILY, Lotus Lunsford Jr V, DO    acetaminophen (TYLENOL) tablet 650 mg, 650 mg, Oral, Q4H PRN **OR** acetaminophen (TYLENOL) solution 650 mg, 650 mg, Per NG tube, Q4H PRN **OR** acetaminophen (TYLENOL) suppository 650 mg, 650 mg, Rectal, Q4H PRN, Lisandra Monroy,     aspirin chewable tablet 81 mg, 81 mg, Oral, DAILY, Lotus Lunsford Jr V, DO    enoxaparin (LOVENOX) injection 40 mg, 40 mg, SubCUTAneous, Q24H, Alec South V, DO, 40 mg at 08/29/17 0117    ketorolac (TORADOL) injection 15 mg, 15 mg, IntraVENous, Q6H PRN, [facility]  V, DO    escitalopram oxalate (LEXAPRO) tablet 5 mg, 5 mg, Oral, QPM, Ltous Isatu South V, DO    gabapentin (NEURONTIN) capsule 300 mg, 300 mg, Oral, QPM, [facility]  V, DO    fish oil-omega-3 fatty acids 340-1,000 mg capsule 1 Cap, 1 Cap, Oral, QPM, [facility]  V, DO    pantoprazole (PROTONIX) tablet 40 mg, 40 mg, Oral, DAILY, [facility]  V, DO    PHYSICAL EXAM  Patient Vitals for the past 12 hrs:   Temp Pulse Resp BP SpO2   08/29/17 0341 97.6 °F (36.4 °C) (!) 57 16 131/76 92 %   08/29/17 0038 98.5 °F (36.9 °C) 71 16 139/77 91 %   08/28/17 2330 - 64 - - -   08/28/17 2141 - 62 - - -   08/28/17 2133 97.7 °F (36.5 °C) 61 16 174/79 95 %   08/28/17 2018 97.7 °F (36.5 °C) 65 15 (!) 201/80 97 %      General: WF in NAD, providing accurate history    Psych: Affect is calm, cooperative, pleasant   Neck: supple, nontender,  No bruit   Heart: regular rhythm and rate    Lungs: clear BBS   Extremities: no LE edema   Skin: no rashes      Neurological Examination:    Mental Status:  Alert, oriented x 4, Good insight and judgement        Commands:  following    Language:  Comprehension: itact       Speech: no dysarthria or aphasia     Cranial Nerves:            I: smell   Not tested    II: visual acuity    deferred    II: visual fields   Full to confrontation    II: pupils   Equal, round, reactive to light    II: optic disc   Not examined    III,VII:   no ptosis of either eyelid    III,IV,VI: extraocular muscles    Full EOM, no nystagmus, no intranuclear opthalmoplegia    V: mastication   symmetrical    V: facial sensation:    Equal V1, V2 and V3 bilaterally with LT    VII: facial muscle function     Symmetric, no facial droop    VIII: hearing   Equal bilaterally    IX: soft palate elevation    Uvula midline, elevates symetrically    XI: trapezius strength    5/5    XI: sternocleidomastoid strength   5/5    XI: neck flexion strength    5/5     XII: tongue    Protrudes midline, no fasciculations or atrophy      Strength/Motor   Drift:       None             Bulk:  appears symmetric            Tone:  normal      Deltoid Biceps Triceps Wrist Extension Finger Abduction   L 5 5 5 5 5   R 5 5 5 5 5      Hip Flexion Hip Extension Knee Flexion Knee Extension Ankle Dorsiflexion Ankle Plantarflexion   L 5 5 5 5 5 5   R 5 5 5 5 5 5      Reflexes:    BR Brachial Patellar Achilles Babinski Startle Glabellar   L 2/4+ 2/4+ 2/4+ NT  downgoing NT NT   R 2/4+ 2/4+ 2/4+ NT downgoing NT NT      Sensory: intact on proximal & distal extremity w/ LT, pressure, temp, and proprioception bilaterally   Coordination: FNF: Intact bilaterally    Heel to shin:  Intact bilaterally    Tremors:  no resting tremors, no intention tremors   Gait: deferred   *TREVA:  Unable to assess due to reduced comprehension, agitation or reduced LOC.    Labs Reviewed  Recent Results (from the past 12 hour(s))   TSH 3RD GENERATION    Collection Time: 08/29/17  2:04 AM   Result Value Ref Range    TSH 0.84 0.36 - 3.74 uIU/mL     Imaging  Reviewed:   CT Results (recent):    Results from Hospital Encounter encounter on 08/28/17   CT CODE NEURO HEAD WO CONTRAST   Narrative EXAM:  CT CODE NEURO HEAD WO CONTRAST    INDICATION:   numbness face, severe headache today since 1530 hours. Bilateral  cheek numbness. COMPARISON: None. CONTRAST:  None. TECHNIQUE: Unenhanced CT of the head was performed using 5 mm images. Brain and  bone windows were generated. CT dose reduction was achieved through use of a  standardized protocol tailored for this examination and automatic exposure  control for dose modulation. FINDINGS:  The ventricles and sulci are normal in size, shape and configuration and  midline. There is no significant white matter disease. There is no intracranial  hemorrhage, extra-axial collection, mass, mass effect or midline shift. The  basilar cisterns are open. No acute infarct is identified. The bone windows  demonstrate no abnormalities. The visualized portions of the paranasal sinuses  and mastoid air cells are clear. Impression IMPRESSION: No acute intracranial abnormality. MRI Results (recent):    Results from [place] encounter on 08/28/17   MRA BRAIN WO CONT   Narrative INDICATION: Headache, chronic, new features or neuro deficit; Stroke    EXAM: MRA HEAD, WITHOUT CONTRAST. COMPARISON:  None    TECHNIQUE:  2-D time-of-flight MRA of the brain was performed. Multiplanar  reconstructions were obtained. FINDINGS:    The vertebral arteries are codominant. The basilar artery and its branches are  patent, with mild luminal irregularity of the proximal posterior cerebral  arteries suggesting possible atherosclerotic change. . The internal carotid,  anterior cerebral, and middle cerebral arteries are patent.  There is no  flow-limiting intracranial stenosis. There is no aneurysm. There are no sizable  posterior communicating arteries. Impression IMPRESSION:  No flow limiting stenosis or intracranial aneurysm. _____________________________________________________________________    Review of Systems: 10 point ROS was performed. Pertinent positives listed in HPI. Denies: balance difficulties, angina, palpitations, weakness, vision loss, slurred speech, aphasia, confusion, fever, chills, falls, diplopia, back pain, neck pain, prior episodes of vertigo, hallucinations, new medications or dosage changes. _____________________________________________________________________  Impression  67 y.o. female with onset of headache triggered by coughing, bending over and moving her arms over past month. Exam findings of significant myofacial pain in bilateral trapezius that caused radiation of pain to bilateral frontal areas, > on left. Imaging reviewed: MRI/MRA without acute findings. Notable Labs are LDL of 222. Feel patient has myofacial tension that caused cervicalgia with radiculopathy of paresthesias/reducec sensation to bilateral cheeks. OTC tylenol and Ibuprofen and Fiorcet were not effective. Refer to plan below. Assessment:  1. Cervicalgia-- 1mo history  2. Radiculopathy causing Paresthesias, facial  3.  hyperlipidemia--no statin PTA    Plan  · Recommend OP PT to help improve range of motion in shoulder and neck area---will help reduce symptoms of pain/HA in future  · Depakote 500mg once IV and prednisone taper x6 days  · OTHER-- if the above doesn't work, Indocin is also effective for cough headaches, would be worth trying OP  ·   Continue great care by collaborating care team and nursing staff. · Recommend OP neurology appt for follow-up  ·  Testing results discussed with patient --- any questions were answered.      May be discharged  My collaborating care team physician may have further recommendations. On DVT Prophylaxis yes no   Continue lovenox while inpatient x      Care Plan discussed with:  Patient x   Family    RN    Care Manager    Consultant/Specialist:  x   Patient will be discussed with Dr. Catalino Ozuna  ______________________________________________________________  Hospital Problems  Date Reviewed: 8/29/2017          Codes Class Noted POA    * (Principal)Headache ICD-10-CM: R51  ICD-9-CM: 784.0  8/28/2017 Yes        Facial numbness ICD-10-CM: R20.0  ICD-9-CM: 782.0  8/28/2017 Yes        GERD (gastroesophageal reflux disease) ICD-10-CM: K21.9  ICD-9-CM: 530.81  8/28/2017 Yes        Peripheral neuropathy ([address] ICD-10-CM: G62.9  ICD-9-CM: 356.9  8/28/2017 Yes        Depression ICD-10-CM: F32.9  ICD-9-CM: 570  8/28/2017 Yes              *Thank you for allowing [facility], to participate in the care of your patient.     ---LENNOX Paredes  ================================================    ADDENDUM--> Collaborating Care Team Physician:

## 2017-08-29 NOTE — H&P
[facility]  [address] [facility], [facility]  [phone]    Hospitalist Admission Note      NAME:  Jillian Godoy   :      MRN:  [id number]     PCP:  Skye Egan MD     Date/Time:  2017 9:37 PM          Subjective:     CHIEF COMPLAINT: Headache, numbness     HISTORY OF PRESENT ILLNESS:     Ms. Bhargavi Chao is a 67 y.o.  female with a hx of depression, GERD, peripheral neuropathy who presented to the Emergency Department complaining of 1 month of progressive frontal headache without aura or neuro findings. Headaches were not typical for patient. No clear onset. Worse with pending forward. Did not wake from sleep. Was working at desk this AM around noon when she felt bilateral cheek numbness prompting ED visit. In ED, CT head negative and tele-neuro work-up rather unremarkable. We will admit for further observation. Medical hx:  -- GERD  -- Depression    Surgical Hx:  -- Denies surgeries    Family Hx:  Maternal grandmother and mother: Stroke    Social Hx:  -- Non-smoker  -- Denies alcohol  -- Denies drug use      Allergies   Allergen Reactions    Tetanus Vaccines And Toxoid Swelling     \"Whole arm swelled up\". Reaction happened years ago per patient. Prior to Admission medications    Medication Sig Start Date End Date Taking? Authorizing Provider   gabapentin (NEURONTIN) 300 mg capsule Take 300 mg by mouth every evening. Yes Historical Provider   escitalopram oxalate (LEXAPRO) 5 mg tablet Take 5 mg by mouth every evening. Yes Historical Provider   butalbital-acetaminophen-caffeine (FIORICET, ESGIC) -40 mg per tablet Take 1 Tab by mouth every six (6) hours as needed for Headache. Yes Historical Provider   omeprazole (PRILOSEC) 20 mg capsule Take 20 mg by mouth every evening. Yes Historical Provider   fish oil-omega-3 fatty acids (FISH OIL) 340-1,000 mg capsule Take 1 Cap by mouth every evening.    Yes Historical Provider aspirin delayed-release 81 mg tablet Take 81 mg by mouth every evening. Yes Historical Provider       Review of Systems:  (bold if positive, if negative)    Gen:  Eyes:  ENT:  CVS:  Pulm:  GI:    :    MS:  Skin:  Psych:  Endo:    Hem:  Renal:    Neuro:  Numbness, tinglingheadache      Objective:      VITALS:    Vital signs reviewed; most recent are:    Visit Vitals    /79 (BP 1 Location: Left arm, BP Patient Position: At rest)    Pulse 61    Temp 97.7 °F (36.5 °C)    Resp 16    Ht 5' 2\" (1.575 m)    Wt 63.5 kg (140 lb)    SpO2 95%    BMI 25.61 kg/m2     SpO2 Readings from Last 6 Encounters:   08/28/17 95%        No intake or output data in the 24 hours ending 08/28/17 2137     Exam:     Physical Exam:    Gen:  Well-developed, well-nourished, in no acute distress  HEENT:  Pink conjunctivae, PERRL, hearing intact to voice, moist mucous membranes  Neck:  Supple, without masses, thyroid non-tender  Resp:  No accessory muscle use, clear breath sounds without wheezes rales or rhonchi  Card:  No murmurs, normal S1, S2 without thrills, bruits or peripheral edema  Abd:  Soft, non-tender, non-distended, normoactive bowel sounds are present, no palpable organomegaly and no detectable hernias  Lymph:  No cervical or inguinal adenopathy  Musc:  No cyanosis or clubbing  Skin:  No rashes or ulcers, skin turgor is good  Neuro:  Cranial nerves are grossly intact, no focal motor weakness, follows commands appropriately  Psych:  Good insight, oriented to person, place and time, alert     Labs:    Recent Labs      08/28/17   1801   WBC  8.0   HGB  13.8   HCT  42.3   PLT  263     Recent Labs      08/28/17   1801   NA  140   K  4.1   CL  104   CO2  30   GLU  92   BUN  11   CREA  0.85   CA  9.1   ALB  4.1   TBILI  0.3   SGOT  14*   ALT  15     Lab Results   Component Value Date/Time    Glucose (POC) 106 08/28/2017 05:42 PM     No results for input(s): PH, PCO2, PO2, HCO3, FIO2 in the last 72 hours.   Recent Labs 08/28/17   1801   INR  1.0     All Micro Results     None          I have reviewed previous records       Assessment and Plan:      Principal Problem:  Headache / Facial numbness. Progressive in nature, new onset neuro finding. Evaluated by tele-neuro who agrees this is not CVA/TIA sx. Admit to OBS. MRI/MRA brain. Neuro consult. Neuro checks. Monitor on telemetry. ASA 81mg, PRN toradol for headache. Check FLP, A1c, TTE    Active Problems:    GERD (gastroesophageal reflux disease). Continue PPI    Peripheral neuropathy. Continue gabapentin    Depression. Continue lexapro.     Telemetry reviewed:   normal sinus rhythm    Risk of deterioration: medium      Total time spent with patient: 48 [address] [facility] discussed with: Patient, Family and Nursing Staff    Discussed:  Care Plan       ___________________________________________________    Attending Physician: Brielle Dow,

## 2017-08-29 NOTE — ED NOTES
[facility]    [address] [facility] [address]    Phone:  [phone]    Fax:  [phone]       Thank You for choosing us for your health care visit. We are glad to serve you and happy to provide you with this summary of your visit. Please help us to ensure we have accurate records. If you find anything that needs to be changed, please let our staff know as soon as possible.          Your Demographic Information     Patient Name Sex     Terry Morris Male 1954       Ethnic Group Patient Race    Not of  or  Origin White      Your Visit Details     Date & Time Provider Department    2017 3:00 PM Masood Toth MD [facility]      Your Upcoming Appointment*(Max 10)     Wednesday February 15, 2017 10:30 AM CST   Worker's Comp Follow Up Visit with Marvel Chisholm MD   [facility] ([facility] [address])    [address] [place] [address]   [phone]              Your To Do List     Follow-Up    Return if symptoms worsen or fail to improve.      Conditions Discussed Today or Order-Related Diagnoses        Comments    Work-related condition    -  Primary     Chronic low back pain with sciatica, sciatica laterality unspecified, unspecified back pain laterality         Bilateral sciatica         Osteoarthritis, generalized         Chronic neck pain         Paresthesia and pain of both upper extremities           Your Vitals Were     BP Pulse Weight BMI Smoking Status       110/76 78 123 lb (55.8 kg) 19.26 kg/m2 Current Every Day Smoker       Medications Prescribed or Re-Ordered Today     None      Your Current Medications Are        Disp Refills Start End    lisinopril (ZESTRIL) 10 MG tablet 30 tablet 3 2016     Sig: TAKE ONE TABLET BY MOUTH IN THE MORNING    Class: Eprescribe    traZODONE (DESYREL) 50 MG tablet 30 tablet 0 2016     Sig: TAKE 1 TABLET BY MOUTH NIGHTLY. FOR  MRI at bedside to transport patient for MRI INSOMNIA.    Class: Eprescribe    VITAMIN D, CHOLECALCIFEROL, PO        Sig - Route: Take  by mouth. - Oral    Class: Historical Med      Allergies     Augmentin [Amoxicillin-pot Clavulanate] VOMITING    Diarrhea and     Morphine [Morphine Sulfate-nacl]     Reaction and severity unknown    Zolpidem Other (See Comments)    Felt \"very wasted but couldn't sleep\"      Immunizations History as of 1/9/2017     Name Date    Tdap 12/21/2010      Problem List as of 1/9/2017     Chronic low back pain    Intervertebral cervical disc disorder with myelopathy, cervical region    Traumatic arthropathy, shoulder region    Secondary localized osteoarthrosis, forearm    Intervertebral lumbar disc disorder with myelopathy, lumbar region    Diverticulosis of colon    Tobacco use disorder    Work-related condition    Sciatica    Hypertension    Hyperlipidemia    Insomnia    Environmental allergies    Nuclear sclerotic cataract of both eyes    Cystic retinal tuft, right eye    Anxiety    Depressive disorder, atypical    Osteoarthritis, generalized    Paresthesia and pain of both upper extremities            Patient Instructions     None

## 2017-08-29 NOTE — PROGRESS NOTES
Abdullahi Colmenares ramirez [place] [address]  [address] [facility], [place], [address] [place] [address]  [phone]      Medical Progress Note      NAME: Silvia Santos   :    MRM:  [id number]    Date/Time: 2017  9:50 AM       Assessment and Plan:     Cervicalgia / Facial numbness / Peripheral neuropathy / Radiculopathy - POA, now better. Teleneurology had concern for CVA, but MRI/MRA negative. Neurology consulted, and they agree with discharge home. Symptoms were from cervical myofacial pain. Neuorology prescribes prednisone taper. Continue gabapentin    Hyperlipidemia - LDL noted to be 222. Start lipitor 40. PCP to follow and adjust.  Continue fish oil    GERD (gastroesophageal reflux disease) - Continue PPI    Depression - Continue lexapro       Subjective:     Chief Complaint:  Facial numbness better. ROS:  (bold if positive, if negative)      Tolerating PT  Tolerating Diet        Objective:     Last 24hrs VS reviewed since prior progress note.  Most recent are:    Visit Vitals    /80 (BP 1 Location: Right arm, BP Patient Position: At rest;Sitting)    Pulse (!) 58    Temp 98 °F (36.7 °C)    Resp 18    Ht 5' 2\" (1.575 m)    Wt 63.5 kg (140 lb)    SpO2 95%    BMI 25.61 kg/m2     SpO2 Readings from Last 6 Encounters:   17 95%        No intake or output data in the 24 hours ending 17 0950     Physical Exam:    Gen:  Well-developed, well-nourished, in no acute distress  HEENT:  Pink conjunctivae, PERRL, hearing intact to voice, moist mucous membranes  Neck:  Supple, without masses, thyroid non-tender  Resp:  No accessory muscle use, clear breath sounds without wheezes rales or rhonchi  Card:  No murmurs, normal S1, S2 without thrills, bruits or peripheral edema  Abd:  Soft, non-tender, non-distended, normoactive bowel sounds are present, no mass  Lymph:  No cervical or inguinal adenopathy  Musc:  No cyanosis or clubbing  Skin:  No rashes or ulcers, skin turgor is good  Neuro: Cranial nerves are grossly intact, no focal motor weakness, follows commands appropriately  Psych:  Good insight, oriented to person, place and time, alert    Telemetry reviewed:   normal sinus rhythm  __________________________________________________________________  Medications Reviewed: (see below)  Medications:     Current Facility-Administered Medications   Medication Dose Route Frequency    atorvastatin (LIPITOR) tablet 40 mg  40 mg Oral DAILY    valproate (DEPACON) 500 mg in 0.9% sodium chloride 50 mL IVPB  500 mg IntraVENous NOW    [START ON 9/3/2017] predniSONE (DELTASONE) tablet 10 mg  10 mg Oral DAILY WITH BREAKFAST    [START ON 9/2/2017] predniSONE (DELTASONE) tablet 20 mg  20 mg Oral DAILY WITH BREAKFAST    [START ON 9/1/2017] predniSONE (DELTASONE) tablet 30 mg  30 mg Oral DAILY WITH BREAKFAST    [START ON 8/31/2017] predniSONE (DELTASONE) tablet 40 mg  40 mg Oral DAILY WITH BREAKFAST    [START ON 8/30/2017] predniSONE (DELTASONE) tablet 50 mg  50 mg Oral DAILY WITH BREAKFAST    predniSONE (DELTASONE) tablet 60 mg  60 mg Oral DAILY WITH BREAKFAST    sodium chloride (NS) flush 5-10 mL  5-10 mL IntraVENous Q8H    sodium chloride (NS) flush 5-10 mL  5-10 mL IntraVENous PRN    ondansetron (ZOFRAN) injection 4 mg  4 mg IntraVENous Q6H PRN    senna-docusate (PERICOLACE) 8.6-50 mg per tablet 1 Tab  1 Tab Oral DAILY    acetaminophen (TYLENOL) tablet 650 mg  650 mg Oral Q4H PRN    Or    acetaminophen (TYLENOL) solution 650 mg  650 mg Per NG tube Q4H PRN    Or    acetaminophen (TYLENOL) suppository 650 mg  650 mg Rectal Q4H PRN    aspirin chewable tablet 81 mg  81 mg Oral DAILY    enoxaparin (LOVENOX) injection 40 mg  40 mg SubCUTAneous Q24H    ketorolac (TORADOL) injection 15 mg  15 mg IntraVENous Q6H PRN    escitalopram oxalate (LEXAPRO) tablet 5 mg  5 mg Oral QPM    gabapentin (NEURONTIN) capsule 300 mg  300 mg Oral QPM    fish oil-omega-3 fatty acids 340-1,000 mg capsule 1 Cap  1 Cap Oral QPM    pantoprazole (PROTONIX) tablet 40 mg  40 mg Oral DAILY        Lab Data Reviewed: (see below)  Lab Review:     Recent Labs      08/28/17   1801   WBC  8.0   HGB  13.8   HCT  42.3   PLT  263     Recent Labs      08/28/17   1801   NA  140   K  4.1   CL  104   CO2  30   GLU  92   BUN  11   CREA  0.85   CA  9.1   ALB  4.1   TBILI  0.3   SGOT  14*   ALT  15   INR  1.0     Lab Results   Component Value Date/Time    Glucose (POC) 106 08/28/2017 05:42 PM     No results for input(s): PH, PCO2, PO2, HCO3, FIO2 in the last 72 hours. Recent Labs      08/28/17   1801   INR  1.0     All Micro Results     None          I have reviewed notes of prior 24hr.     Other pertinent lab: none    Total time spent with patient: [address] [place] [address] [place] discussed with: Patient, Care Manager, Nursing Staff, Consultant/Specialist and >50% of time spent in counseling and coordination of care    Discussed:  Care Plan and D/C Planning    Prophylaxis:  H2B/PPI    Disposition:  Home w/Family           ___________________________________________________    Attending Physician: Clary Harris MD

## 2017-08-29 NOTE — ED NOTES
Patient returned from MRI     TRANSFER - OUT REPORT:    Verbal report given to Oz Xavier RN (name) on [facility] Gave  being transferred to Remote Tele 514(unit) for routine progression of care       Report consisted of patients Situation, Background, Assessment and   Recommendations(SBAR). Information from the following report(s) SBAR, Kardex, ED Summary, Intake/Output, MAR, Recent Results and Cardiac Rhythm SR was reviewed with the receiving nurse. Lines:   Peripheral IV 08/28/17 Left Antecubital (Active)        Opportunity for questions and clarification was provided.       Patient transported with:   [employer]

## 2017-08-29 NOTE — DISCHARGE SUMMARY
Physician Discharge Summary     Patient ID:  Darci Rascon  [id number]  41 y.o.  1945    Admit date: 8/28/2017    Discharge date and time: 8/29/2017    Admission Diagnoses: Headache    Discharge Diagnoses:    Principal Diagnosis   Cervicalgia                                             Other Diagnoses    Facial numbness (8/28/2017)    GERD (gastroesophageal reflux disease) (8/28/2017)    Peripheral neuropathy ([address] (8/28/2017)    Depression (8/28/2017)    Hyperlipidemia ()    Neuropathy (HCC) ()    Radiculopathy (8/29/2017)     Hospital Course:   Cervicalgia / Facial numbness / Peripheral neuropathy / Radiculopathy - POA, now better. Teleneurology had concern for CVA, but MRI/MRA negative. Neurology consulted, and they agree with discharge home. Symptoms were from cervical myofacial pain. Neuorology prescribes prednisone taper. Continue gabapentin     Hyperlipidemia - LDL noted to be 222. Start lipitor 40. PCP to follow and adjust.  Continue fish oil     GERD (gastroesophageal reflux disease) - Continue PPI     Depression - Continue lexapro     PCP: Lidia Toledo MD    Consults: Neurology    Significant Diagnostic Studies: See Hospital Course    Discharged home in improved condition.     Discharge Exam:   /80 (BP 1 Location: Right arm, BP Patient Position: At rest;Sitting)    Pulse (!) 58    Temp 98 °F (36.7 °C)    Resp 18    Ht 5' 2\" (1.575 m)    Wt 63.5 kg (140 lb)    SpO2 95%    BMI 25.61 kg/m2      Gen:  Well-developed, well-nourished, in no acute distress  HEENT:  Pink conjunctivae, PERRL, hearing intact to voice, moist mucous membranes  Neck:  Supple, without masses, thyroid non-tender  Resp:  No accessory muscle use, clear breath sounds without wheezes rales or rhonchi  Card:  No murmurs, normal S1, S2 without thrills, bruits or peripheral edema  Abd:  Soft, non-tender, non-distended, normoactive bowel sounds are present, no mass  Lymph:  No cervical or inguinal adenopathy  Musc: No cyanosis or clubbing  Skin:  No rashes or ulcers, skin turgor is good  Neuro:  Cranial nerves are grossly intact, no focal motor weakness, follows commands appropriately  Psych:  Good insight, oriented to person, place and time, alert    Patient Instructions:   Current Discharge Medication List      START taking these medications    Details   atorvastatin (LIPITOR) 40 mg tablet Take 1 Tab by mouth daily for 90 days. Qty: 30 Tab, Refills: 2      predniSONE (DELTASONE) 10 mg tablet Take 6 tabs for 1 day, then 5 tabs for 1 day, then 4 tabs for 1 day, then 3 tabs for 1 day, then 2 tabs for 1 day, then 1 tab for 1 day. Qty: 21 Tab, Refills: 0         CONTINUE these medications which have NOT CHANGED    Details   gabapentin (NEURONTIN) 300 mg capsule Take 300 mg by mouth every evening.      escitalopram oxalate (LEXAPRO) 5 mg tablet Take 5 mg by mouth every evening. butalbital-acetaminophen-caffeine (FIORICET, ESGIC) -40 mg per tablet Take 1 Tab by mouth every six (6) hours as needed for Headache. omeprazole (PRILOSEC) 20 mg capsule Take 20 mg by mouth every evening. fish oil-omega-3 fatty acids (FISH OIL) 340-1,000 mg capsule Take 1 Cap by mouth every evening. aspirin delayed-release 81 mg tablet Take 81 mg by mouth every evening. Activity: Activity as tolerated  Diet: Cardiac Diet and Low fat, Low cholesterol  Wound Care: None needed    Follow-up with your PCP in a few weeks.   Follow-up tests/labs - none    Signed:  Nancy Edwards MD  8/29/2017  9:57 AM

## 2017-08-29 NOTE — PROGRESS NOTES
8/29/17  CM spoke with pt to complete initial assessment and discuss d/c plans. Pt lives with  in one story home. She has not had home health previously. PCP is Dr. Justice Jesus. No discharge needs are anticipated. Pt is independent with adls and has no dme. CM explained observation status. Pt expressed understanding and signed letter which CM placed in chart. Pt's  will pick pt up when ready for d/c.  Raudel Carrillo, MICKI

## 2017-12-27 ENCOUNTER — OP HISTORICAL/CONVERTED ENCOUNTER (OUTPATIENT)
Dept: OTHER | Age: 72
End: 2017-12-27

## 2018-01-04 ENCOUNTER — OP HISTORICAL/CONVERTED ENCOUNTER (OUTPATIENT)
Dept: OTHER | Age: 73
End: 2018-01-04

## 2018-01-06 ENCOUNTER — ED HISTORICAL/CONVERTED ENCOUNTER (OUTPATIENT)
Dept: OTHER | Age: 73
End: 2018-01-06

## 2020-03-09 ENCOUNTER — IP HISTORICAL/CONVERTED ENCOUNTER (OUTPATIENT)
Dept: OTHER | Age: 75
End: 2020-03-09

## 2022-03-18 PROBLEM — M54.10 RADICULOPATHY: Status: ACTIVE | Noted: 2017-08-29

## 2022-03-18 PROBLEM — G62.9 PERIPHERAL NEUROPATHY: Status: ACTIVE | Noted: 2017-08-28

## 2022-03-18 PROBLEM — K21.9 GERD (GASTROESOPHAGEAL REFLUX DISEASE): Status: ACTIVE | Noted: 2017-08-28

## 2022-03-19 PROBLEM — R20.0 FACIAL NUMBNESS: Status: ACTIVE | Noted: 2017-08-28

## 2022-03-19 PROBLEM — F32.A DEPRESSION: Status: ACTIVE | Noted: 2017-08-28

## 2022-03-20 PROBLEM — M54.2 CERVICALGIA: Status: ACTIVE | Noted: 2017-08-29

## 2022-07-15 ENCOUNTER — HOSPITAL ENCOUNTER (OUTPATIENT)
Dept: PHYSICAL THERAPY | Age: 77
End: 2022-07-15
Payer: MEDICARE

## 2022-07-19 ENCOUNTER — HOSPITAL ENCOUNTER (OUTPATIENT)
Dept: PHYSICAL THERAPY | Age: 77
Discharge: HOME | End: 2022-07-19
Payer: MEDICARE

## 2022-07-19 PROCEDURE — 97110 THERAPEUTIC EXERCISES: CPT

## 2022-07-19 PROCEDURE — 97165 OT EVAL LOW COMPLEX 30 MIN: CPT

## 2022-07-19 NOTE — PROGRESS NOTES
[address] [address] [facility] [address] [facility], [address] [place] [address]  Ph: [phone]    Fax: [phone]    Initial Evaluation/Plan of Care/Statement of Necessity for Occupational Therapy Services     Patient name: Bernarda Gould   : 1945  [x]  Patient  Verified Provider#: [id number]    Start of Care:  22        Referral source: Belinda Kilpatrick MD Return visit to MD: BEAN  Medical/Treatment Diagnosis: Right hand pain [M79.641]  Unilateral primary osteoarthritis of first carpometacarpal joint, right hand [M18.11]  Encounter for other specified surgical aftercare [Z48.89]    Payor: Charlesfransisco Mercado / Plan: [place] MEDICARE PART A & B / Product Type: Medicare /       Prior Hospitalization: see medical history     Comorbidities: none noted  Prior Level of Function: independent  Medications: Verified on Patient Summary List          Patient / Family readiness to learn indicated by: asking questions and interest    Persons(s) to be included in education: patient (P)    Barriers to Learning/Limitations: None    Patient Self Reported Health Status: excellent    Rehabilitation Potential: good    Previous Treatment/Compliance: cortisone injections over the past 12 - 18 months    PMHx/Surgical Hx: approximately 4 weeks post op; right thumb CMC arthroplasty with tendon transfer    Work Hx: retired    Living Situation: with spouse    Barriers to progress: none    Motivation: excellent    Substance use: none reported    Cognition: A & O x 4     Onset Date: 22    Visit #: 1       In time: 26  Out time:520 Total Treatment Time (min): 40   Total Timed Codes (min): 10 1:1 Treatment Time ( [address] only): 10       SUBJECTIVE    Post op week; 3 1/2     Mechanism of Injury:  Patient states she had right thumb surgery about 3 - 4 weeks ago (22). She tried cortisone injections over a period of 12 - 18 months. The first injection lasted \"about 8 months' per patient report.   Patient states the second helped a little whereas the third \"did nothing\". Patient never tried/wore a splint. Since the surgery, patient reports pain that comes and goes--burning, stinging pain. Patient not wearing any splint/brace/support to the RUE. Patient reports wearing the splint when she is \"out in public'. Bandaid on right  volar mid forearm (sutures intact) as well as a guaze bandage covering several intact sutures on the dorsal R thumb. PAIN:  Area of pain: base (~ CMC) of right thumb  Pain descriptors: localized , occasional   Pain Level (0-10 scale)  Worst:10   Least: 5  Things that worsen pain: pressure on the hand (palm down)   Things that ease pain: heat  Pain Level (0-10 scale) pre treatment: 3-4, volar mid/distal forearm Pain Level (0-10 scale) post treatment: 0      OBJECTIVE/FUNCTIONAL MEASURES:      ADL/IADLs:    Eating: using the L hand at this time      UB Dressing: Mod I     LB  Dressing: mod I              Bathing:  Min A (left side and back)  Grooming: completes with left (non dominant) hand  Driving:  Using L hand      Medication Management   Handwriting: modified           Sleep:  No issues reported  Leisure: quilting (currently unable to use scissors to cut material); Art work (pen and ink)--unable to hold instrument at this time      Orthotics/Prosthetics:removeable cast/support    Precautions/restrictions: no lifting with the right hand            Upper Extremity Assessment:    Hand Dominance: right     Opposition:    Right: intact        Measurements:  deferred     Fine Motor:     Nine-Hole Peg Test:  (in seconds)  Left: 23  Right:  20        SENSATION  Light Touch [x]WFL  , RUE        [] Impaired          Hand Appearance/Additional comments:  See chart/handout    Muscle Tone: normal    Edema:    EDEMA Measurements:   In mm Right  7/19 Left  7/19 Right Right Right Right   THUMB           P1 68 60       DPC 18.7 cm 18.8 cm       Wrist Crease 17.6 cm 15.3 cm       Figure 8 38.1 cm 37.8 cm           ROM:       Active Active/Passive   DATE:  7/19 7/19       Right Left Right Left   Forearm Supination 77 76      Pronation WFL WFL     Wrist Flex 68 76      Ext 67 80      Ulnar Dev 24 38      Radial Dev 11 28     Finger Flex/ext [facility] WFL      MP ext [facility] WFL      ABD/ADD WFL WFL     thumb MP flex 25 65      IP flex 55 67      IP ext 0 0+         10 min Therapeutic Exercise:  [x] See flow sheet :   Rationale: increase ROM and improve coordination to improve the patients ability to use the right hand for daily activities     EVALUATION COMPLEXITY (High, Medium, low): Low      ASSESSMENT:   Patient is a 66 y/o right hand dominant female referred to occupational therapy services due to limitations engaging independently in daily activities following a right [facility] arthroplasty. Patient presents with decreased ROM, edema, pain, activity tolerance. Feel patient will benefit from skilled OT services to address limitations, promote return to PLOF/maximal level of function/activity participation. Am-Pac: 48 %      Problem List/Impairments: Pain effecting function, Decreased range of motion, Decreased strength, Edema effecting function, Decreased coordination/prehension, Decreased ADL/functional abilities , Decreased activity tolerance, and Decreased flexibility/joint mobility    Frequency / Duration: Patient to be seen 1-2  times per week for 6-8 weeks. Certification Period: 7/19/22 - 9/13/22    Treatment Plan may include any combination of the following: Therapeutic exercise, Therapeutic activities, Physical agent/modality, Scar management, Splinting/orthoses, Patient education, and ADLs/IADLs    Patient/ Caregiver education and instruction: activity modification, brace/ splint application, and exercises    Patient Goal (s): \"to be able to use my hand again\"    Short Term Goals: To be accomplished in 6-8 treatments:   1.   Patient will be independent with HEP to promote gains between sessions    [] Met [] Not met [] Partially met     2. Patient will be able to use the right hand for light ADL tasks such as eating, dressing, grooming tasks    [] Met [] Not met [] Partially met     3. Patient will be independent with scar and edema management techniques to promote increased hand use for daily activities    [] Met [] Not met [] Partially met        Long Term Goals: To be accomplished in 12-16 treatments:   1. Patient will be able to use scissors for ADL, IADL, and leisure activities    [] Met [] Not met [] Partially met     2. Patient will be able to operate lawnmower with little to no difficulty    [] Met [] Not met [] Partially met     3. Patient will be able to resume artwork activities taking rest breaks as needed, with little to no difficulty    [] Met [] Not met [] Partially met     4. Patient will be mod I with light housekeeping chores    [] Met [] Not met [] Partially met     5. Patient will be independent with all BADLs    [] Met [] Not met [] Partially met      [x]  Plan of care will be reviewed with ALONZO if appropriate. The Plan of Care is based on information from the initial evaluation. Koki Woods, OTR/L 7/19/2022   ________________________________________________________________________    I certify that the above Therapy Services are being furnished while the patient is under my care. I agree with the treatment plan and certify that this therapy is necessary.     [de-identified] Signature:____________________  Date:____________Time: _________

## 2022-07-26 ENCOUNTER — HOSPITAL ENCOUNTER (OUTPATIENT)
Dept: PHYSICAL THERAPY | Age: 77
Discharge: HOME | End: 2022-07-26
Payer: MEDICARE

## 2022-07-26 PROCEDURE — 97110 THERAPEUTIC EXERCISES: CPT

## 2022-07-26 PROCEDURE — 97140 MANUAL THERAPY 1/> REGIONS: CPT

## 2022-07-26 NOTE — PROGRESS NOTES
OT DAILY TREATMENT NOTE - [facility]     Patient Name: Justice Marshall  Date:2022  : 1945  [x]  Patient  Verified  Payor: [place] MEDICARE / Plan: [place] MEDICARE PART A & B / Product Type: Medicare /    In time:420  Out time:505  Total Treatment Time (min): 45  Total Timed Codes (min): 45  1:1 Treatment Time ( [address] only): 39   Visit #: 2    Treatment Area: Right hand pain [M79.641]  Unilateral primary osteoarthritis of first carpometacarpal joint, right hand [M18.11]  Encounter for other specified surgical aftercare [Z48.89]    SUBJECTIVE  Pain Level in    (0-10 scale): 2, R forearm  Pain Level out  (0-10 scale): 0         Any medication changes, allergies to medications, adverse drug reactions, diagnosis change, or new procedure performed?: [x] No    [] Yes (see summary sheet for update)  Subjective functional status/changes:   [x] changes reported:  --patient reports discomfort in the right mid to distal forearm. Large bandaid covering volar thumb incision; small bandaid covering incision on mid forearm. Patient states she does not use a splint at this time      Patient at 4.5 weeks post op      OBJECTIVE    30 min Therapeutic Exercise:  [x] See flow sheet :   Rationale: increase ROM to improve the patients ability to use the RUE /hand with less difficulty    15 min Manual Therapy:  see flow sheet   Rationale: decrease pain and decrease edema  to promote healing    With   [x] TE   [] TA   [] neuro   [x] other: Patient Education: [] Initiate HEP  [x] Review HEP  [] Progressed/Changed HEP based on:   [] positioning   [] body mechanics   [] transfers   [] heat/ice use   [] Splint wear/care   [] Sensory re-education   [x] scar management      [] edema management   []  other:                 ASSESSMENT/Changes in Function: Patient tolerance to treatment:  [] poor  [x] fair  [] good  []  excellent   Patient reports increased distal forearm discomfort today. Both incisions healing and are  approximated.  No drainage noted. Mild increase in temperature felt /noted along volar thumb incision. Discussed use of ice to manage pain/edema. Educated patient on scar and edema massage--patient verbalized /redemonstrated understanding. Also recommended patient obtain/use a thumb/wrist splint due to increased pain up to and perhaps beyond as need , the 6 week post op timeframe (patient at 4.5 weeks post op)  Patient will continue to benefit from skilled OT services to address Pain effecting function, Decreased range of motion, Decreased strength, Edema effecting function, Decreased coordination/prehension, Decreased ADL/functional abilities , Decreased activity tolerance, and Decreased flexibility/joint mobility to attain remaining goals. [x]  See Plan of Care  []  See progress note/recertification  []  See Discharge Summary         Progress towards goals / Updated goals:  Short Term Goals: To be accomplished in 6-8 treatments:              1.  Patient will be independent with HEP to promote gains between sessions                          [] Met [] Not met [] Partially met                2.  Patient will be able to use the right hand for light ADL tasks such as eating, dressing, grooming tasks                          [] Met [] Not met [] Partially met                3.  Patient will be independent with scar and edema management techniques to promote increased hand use for daily activities                          [] Met [] Not met [] Partially met          Long Term Goals:  To be accomplished in 12-16 treatments:              1.  Patient will be able to use scissors for ADL, IADL, and leisure activities                          [] Met [] Not met [] Partially met                2.  Patient will be able to operate lawnmower with little to no difficulty                          [] Met [] Not met [] Partially met                3.  Patient will be able to resume artwork activities taking rest breaks as needed, with little to no difficulty                          [] Met [] Not met [] Partially met                4.  Patient will be mod I with light housekeeping chores                          [] Met [] Not met [] Partially met                5.  Patient will be independent with all BADLs                          [] Met [] Not met [] Partially met      PLAN  [x]  Upgrade activities as tolerated     [x]  Continue plan of care  []  Update interventions per flow sheet       []  Discharge due to:_  [x]  Other:_ follow up on splint use     Ritchie Rachel, OTR/L 7/26/2022

## 2022-08-02 ENCOUNTER — HOSPITAL ENCOUNTER (OUTPATIENT)
Dept: PHYSICAL THERAPY | Age: 77
Discharge: HOME | End: 2022-08-02
Payer: MEDICARE

## 2022-08-02 PROCEDURE — 97110 THERAPEUTIC EXERCISES: CPT

## 2022-08-02 PROCEDURE — 97140 MANUAL THERAPY 1/> REGIONS: CPT

## 2022-08-02 NOTE — PROGRESS NOTES
OT DAILY TREATMENT NOTE - [facility]     Patient Name: Tobi Arredondo  Date:2022  : 1945  [x]  Patient  Verified  Payor: [place] MED[place]ARE / Plan: [place] MEDICARE PART A & B / Product Type: Medicare /    In time:340  Out time:430  Total Treatment Time (min): 50  Total Timed Codes (min): 50  1:1 Treatment Time ( [address] only): 50   Visit #: 3    Treatment Area: Right hand pain [M79.641]  Unilateral primary osteoarthritis of first carpometacarpal joint, right hand [M18.11]  Encounter for other specified surgical aftercare [Z48.89]    SUBJECTIVE  Pain Level in    (0-10 scale): 0  Pain Level out  (0-10 scale): 0         Any medication changes, allergies to medications, adverse drug reactions, diagnosis change, or new procedure performed?: [x] No    [] Yes (see summary sheet for update)  Subjective functional status/changes:   [x] changes reported    -patient arrived to tx session with RUFLORES in a sling, CMC thumb splint on R hand  --patient states she has been wearing a sling for about a weeks during  -patient states she wears the [facility] thumb splint most of the day; reports her hand has been feeling better. --patient reports her right arm was bumped by a relative's dog recently. Noted bruising on the volar R forearm. Thumb incision: min redness noted along incision; min warmth palpated, also. Patient states she uses ice as needed.        --at 5.5 week post op      OBJECTIVE      35 min Therapeutic Exercise:  [x] See flow sheet :   Rationale: increase ROM to improve the patients ability to engage in bilateral UE tasks with less pain and difficulty    15 min Manual Therapy:  see flow sheet   Rationale: decrease pain and decrease edema  to allow with improved motion in the [facility] such as needed for daily task participation    With   [x] TE   [] TA   [] neuro   [x] other: Patient Education: [] Initiate HEP  [x] Review HEP  [] Progressed/Changed HEP based on:   [x] positioning   [] body mechanics   [] transfers   [] heat/ice use [x] Splint wear   [] Sensory re-education   [] scar management      [] edema management   []  other:                     ASSESSMENT/Changes in Function: Patient tolerance to treatment:  [] poor  [x] fair to  [x] good  []  excellent   Patient notes decreased right UE discomfort since using the sling and thumb splint up to last few days when her right arm was accidentally bumped by a relative's large dog. Both incision approximated, healing with mild redness noted around thumb incision. Patient is now at 5 1/2 weeks post op. Scar massage has been introduced with trace adherence noted in both incisions. Patient will continue to benefit from skilled OT services to address Pain effecting function, Decreased range of motion, Decreased strength, Edema effecting function, Decreased coordination/prehension, Decreased ADL/functional abilities , Decreased activity tolerance, and Decreased flexibility/joint mobility to attain remaining goals. [x]  See Plan of Care  []  See progress note/recertification  []  See Discharge Summary         Progress towards goals / Updated goals:  Short Term Goals: To be accomplished in 6-8 treatments:              1.  Patient will be independent with HEP to promote gains between sessions                          [] Met [] Not met [x] Partially met                2.  Patient will be able to use the right hand for light ADL tasks such as eating, dressing, grooming tasks                          [] Met [] Not met [] Partially met                3.  Patient will be independent with scar and edema management techniques to promote increased hand use for daily activities                          [] Met [] Not met [x] Partially met          Long Term Goals:  To be accomplished in 12-16 treatments:              1.  Patient will be able to use scissors for ADL, IADL, and leisure activities                          [] Met [] Not met [] Partially met                2.  Patient will be able to operate lawnmower with little to no difficulty                          [] Met [] Not met [] Partially met                3.  Patient will be able to resume artwork activities taking rest breaks as needed, with little to no difficulty                          [] Met [] Not met [] Partially met                4.  Patient will be mod I with light housekeeping chores                          [] Met [] Not met [] Partially met                5.  Patient will be independent with all BADLs                          [] Met [] Not met [] Partially met      PLAN  [x]  Upgrade activities as tolerated     [x]  Continue plan of care  []  Update interventions per flow sheet       []  Discharge due to:_  []  Other:_      Pravin Grant OTR/OLGA 8/2/2022

## 2022-08-09 ENCOUNTER — APPOINTMENT (OUTPATIENT)
Dept: PHYSICAL THERAPY | Age: 77
End: 2022-08-09
Payer: MEDICARE

## 2022-08-16 ENCOUNTER — HOSPITAL ENCOUNTER (OUTPATIENT)
Dept: PHYSICAL THERAPY | Age: 77
Discharge: HOME | End: 2022-08-16
Payer: MEDICARE

## 2022-08-16 PROCEDURE — 97110 THERAPEUTIC EXERCISES: CPT

## 2022-08-16 NOTE — PROGRESS NOTES
OT DAILY TREATMENT NOTE - [facility]     Patient Name: Galidno Brought  Date:2022  : 1945  [x]  Patient  Verified  Payor: [place] MEDICARE / Plan: [place] MEDICARE PART A & B / Product Type: Medicare /    In time:    Out time:1223  Total Treatment Time (min): 48  Total Timed Codes (min): 45  1:1 Treatment Time ( only): 39   Visit #: 4    Treatment Area: Right hand pain [M79.641]  Unilateral primary osteoarthritis of first carpometacarpal joint, right hand [M18.11]  Encounter for other specified surgical aftercare [Z48.89]    SUBJECTIVE  Pain Level in    (0-10 scale): 0  Pain Level out  (0-10 scale): 0         Any medication changes, allergies to medications, adverse drug reactions, diagnosis change, or new procedure performed?: [x] No    [] Yes (see summary sheet for update)  Subjective functional status/changes:   [x] changes reported    7.5 weeks post op    --Ortho follow up visit on    --patient states she resumed taking an anti-inflammatory (Aleve/Ibuprofen) and his has made a big difference in how her thumb/hand feels    OBJECTIVE    45 min Therapeutic Exercise:  [x] See flow sheet :   Rationale: increase ROM and improve coordination to improve the patients ability to engage in daily activities using the R hand with less pain, difficulty  --re-assessment; see below for details    With   [x] TE   [] TA   [] neuro   [] other: Patient Education: [] Initiate HEP  [x] Review HEP  [] Progressed/Changed HEP based on:   [] positioning   [] body mechanics   [] transfers   [] heat/ice use   [] Splint wear/care   [] Sensory re-education   [] scar management      [] edema management   [x]  other: results of re-assessment            Other Objective/Functional Measures:      Edema:     EDEMA Measurements:   In mm Right   Left   Right   Right Right Right   THUMB           P1 68 60  63         DPC 18.7 cm 18.8 cm  18.7 cm         Wrist Crease 17.6 cm 15.3 cm  16.15 cm         Figure 8          38.1 cm 37.8 cm  39.5 cm               ROM:                  Active Active   DATE:   7/19 7/19 8/16          Right Left Right Left   Forearm Supination 77 76  WFL       Pronation [facility]     Wrist Flex 68 76  69       Ext 67 80  66       Ulnar Dev 24 38  22       Radial Dev 11 28  13     Finger Flex/ext [facility]  WFL       MP ext [facility]  WFL       ABD/ADD WFL WFL  WFL     thumb MP flex 25 65  35       IP flex 55 67  75       IP ext 0 0+  0+         ASSESSMENT/Changes in Function: Patient tolerance to treatment:  [] poor  [] fair  [x] good  []  excellent   Patient not seen in 2 weeks due to illness. Patient states she resumed taking an anti-inflammatory (made her nauseated) which made a difference in how her right hand feels--less pain and edema. Patient wears a sling when out in public as well as at home as needed. Brief re-assessment completed today for 8/17/22 MD follow up visit--see above for details. Edema decreasing; incision healing well; no adherence palpated. Thumb RoM improving. Patient will continue to benefit from skilled OT services to address Pain effecting function, Decreased range of motion, Decreased strength, Edema effecting function, Decreased coordination/prehension, Decreased ADL/functional abilities , Decreased activity tolerance, and Decreased flexibility/joint mobility  to attain remaining goals. [x]  See Plan of Care           Progress towards goals / Updated goals:  Short Term Goals:  To be accomplished in 6-8 treatments:              1.  Patient will be independent with HEP to promote gains between sessions                          [x] Met [] Not met [] Partially met                2.  Patient will be able to use the right hand for light ADL tasks such as eating, dressing, grooming tasks                          [x] Met [] Not met [] Partially met                3.  Patient will be independent with scar and edema management techniques to promote increased hand use for daily activities                          [x] Met [] Not met [] Partially met          Long Term Goals:  To be accomplished in 12-16 treatments:              1.  Patient will be able to use scissors for ADL, IADL, and leisure activities                          [] Met [x] Not met [] Partially met                2.  Patient will be able to operate lawnmower with little to no difficulty                          [] Met [] Not met [] Partially met                3.  Patient will be able to resume artwork activities taking rest breaks as needed, with little to no difficulty                          [] Met [] Not met [] Partially met                4.  Patient will be mod I with light housekeeping chores                          [x] Met [] Not met [] Partially met                5.  Patient will be independent with all BADLs                          [] Met [] Not met [x] Partially met      PLAN  [x]  Upgrade activities as tolerated     [x]  Continue plan of care  []  Update interventions per flow sheet       []  Discharge due to:_  [x]  Other: follow up on MD visit      Piute Rachel, OTR/L 8/16/2022

## 2022-09-02 ENCOUNTER — HOSPITAL ENCOUNTER (OUTPATIENT)
Dept: PHYSICAL THERAPY | Age: 77
Discharge: HOME | End: 2022-09-02
Payer: MEDICARE

## 2022-09-02 PROCEDURE — 97110 THERAPEUTIC EXERCISES: CPT

## 2022-09-02 PROCEDURE — 97760 ORTHOTIC MGMT&TRAING 1ST ENC: CPT

## 2022-09-02 NOTE — PROGRESS NOTES
OT DAILY TREATMENT NOTE - [facility]     Patient Name: Thai Victoria  Date:2022  : 1945  [x]  Patient  Verified  Payor: [place] MEDICARE / Plan: VA MEDICARE PART A & B / Product Type: Medicare /    In time:210  Out time:255  Total Treatment Time (min): 45  Total Timed Codes (min): 45  1:1 Treatment Time ([facility] only): 39   Visit #: 5    Treatment Area: Right hand pain [M79.641]  Unilateral primary osteoarthritis of first carpometacarpal joint, right hand [M18.11]  Encounter for other specified surgical aftercare [Z48.89]    SUBJECTIVE  Pain Level in    (0-10 scale): 0  Pain Level out  (0-10 scale): 0         Any medication changes, allergies to medications, adverse drug reactions, diagnosis change, or new procedure performed?: [x] No    [] Yes (see summary sheet for update)  Subjective functional status/changes:   [x]  changes reported  --patient reports there remains a \"pulling\" sensation in the volar forearm  --patient states she mainly has thumb pain at night.   Her current thumb splint (neoprene) irritates her incision (dorsal thumb)/applies too much pressure on the thumb    OBJECTIVE    *10 week post op**  20 min Therapeutic Exercise:  [x] See flow sheet :   Rationale: increase ROM and increase strength to improve the patients ability to use the right hand for daily activities    25 min Orthotic/Splinting: see flow sheet   Rationale: increase ROM, increase strength, and improve coordination  to improve the patients ability to resume pain free activity participation when using the right hand      With   [x] TE   [] TA   [] neuro   [] other: Patient Education: [] Initiate HEP  [] Review HEP  [x] Progressed/Changed HEP: IF ROM, wrist/finger extension/flexion  [] positioning   [] body mechanics   [] transfers   [] heat/ice use   [x] Splint wear/care   [] Sensory re-education   [] scar management      [] edema management   []  other:             Other Objective/Functional Measures:      Measurements: Taken with Kare[employer] Dynamometer, in lbs   Level 2 DATE  9/2 DATE DATE DATE DATE   Right 19       Left 45         Pinch Measurements: Taken with Pinch Gauge, in lbs    Date  9/2 Date Date   3 pt      Right <1     Left  8.5     Lateral      Right N/t     Left      Tip      Right N/t     Left                 ASSESSMENT/Changes in Function: Patient tolerance to treatment:  [] poor  [x] fair  [] good  []  excellent   Patient reports hypersensitivity to pressure on the right  thumb metacarpal (current neoprene thumb spica splint). No hypersensitivity noted with light touch , mobilization of right  dorsal thumb scar. Tension/restriction reported in the right volar forearm--revised HEP. Fabricated hand based thumb spica splint from [employer] per script. Updated script received for therapy to include strengthening. Provided patient with stockinette  to protect R hand when wearing the hand splint. Patient will continue to benefit from skilled OT services to address Pain effecting function, Decreased range of motion, Decreased strength, Edema effecting function, Decreased coordination/prehension, Decreased ADL/functional abilities , Decreased activity tolerance, and Decreased flexibility/joint mobility  to attain remaining goals. [x]  See Plan of Care  []  See progress note/recertification  []  See Discharge Summary         Progress towards goals / Updated goals:  Short Term Goals:  To be accomplished in 6-8 treatments:              1.  Patient will be independent with HEP to promote gains between sessions                          [x] Met [] Not met [] Partially met                2.  Patient will be able to use the right hand for light ADL tasks such as eating, dressing, grooming tasks                          [x] Met [] Not met [] Partially met                3.  Patient will be independent with scar and edema management techniques to promote increased hand use for daily activities                          [x] Met [] Not met [] Partially met          Long Term Goals:  To be accomplished in 12-16 treatments:              1.  Patient will be able to use scissors for ADL, IADL, and leisure activities                          [] Met [x] Not met [] Partially met                2.  Patient will be able to operate lawnmower with little to no difficulty                          [] Met [] Not met [] Partially met                3.  Patient will be able to resume artwork activities taking rest breaks as needed, with little to no difficulty                          [] Met [] Not met [] Partially met                4.  Patient will be mod I with light housekeeping chores                          [x] Met [] Not met [] Partially met                5.  Patient will be independent with all BADLs                          [] Met [] Not met [x] Partially met      PLAN  [x]  Upgrade activities as tolerated     [x]  Continue plan of care  []  Update interventions per flow sheet       []  Discharge due to:_  []  Other:_      KUMAR Ritter/OLGA 9/2/2022

## 2022-09-09 ENCOUNTER — HOSPITAL ENCOUNTER (OUTPATIENT)
Dept: PHYSICAL THERAPY | Age: 77
Discharge: HOME | End: 2022-09-09
Payer: MEDICARE

## 2022-09-09 PROCEDURE — 97110 THERAPEUTIC EXERCISES: CPT

## 2022-09-09 NOTE — THERAPY RECERTIFICATION
[address] [address] [facility] [address] [facility], [address] [place] [address]  Ph: [phone]    Fax: [phone]    Continued Plan of Care/Re-certification  for Occupational Therapy Services    Patient name: Boby Jon   :  [birth year]   Referral source: Josemanuel Jimenez MD   Medical Diagnosis: Right hand pain [M79.641]  Unilateral primary osteoarthritis of first carpometacarpal joint, right hand [M18.11]  Encounter for other specified surgical aftercare [Z48.89]     Treatment Diagnosis: right hand/thumb pain    Start of Care:22   Visits from Start of Care:6   Missed visits: 1    Re-certification Dates: 22 - 10/29/22    Objective key information:  Pinch Measurements: Taken with Pinch Gauge, in lbs     Date   Date   Date   3 pt         Right <1  see      Left  8.5 See       Lateral         Right N/t  3     Left   9      Tip         Right N/t  <0.5     Left   3.5           Measurements: Taken with Kareem Dynamometer, in lbs   Level 2 DATE   DATE DATE DATE DATE   Right 19           Left 45              Fine Motor/Dexterity;   JEBSEN HAND FUNCTION TEST (time in seconds)      Hand / Date Right, 22         Writing 11         Cards 8         Small objects 6            Am-Pac:29 %    Summary of Care:   Patient has been seen by Occupational Therapy services for 6 visits since start of care. Patient is now at 11 weeks post-op. Patient recently seen for follow up visit (22) with new script for strengthening and a hand based thumb spica splint request.  Patient reports decreased pain/discomfort overall in the R hand. She continues to take an anti-inflammatory medication. Patient reports the thumb spica splint fabricated recently is irritating  her hand. Patient improvised and made a small bandage to support the thumb. Modified patient's bandaging pattern  so thumb is in a functional position---pt verbalized understanding.  Progressed HEP to include light hand strengthening. Patient tolerating wrist strengthening exercises. Will provide additional education on joint protection to help preserve the thumb CMC joint. Patient is progressing towards goals. Recommend patient continue with OT services per script, to address remaining goals, promote maximal level of function. Goal Status:  Short Term Goals: To be accomplished in 6-8 treatments:              1.  Patient will be independent with HEP to promote gains between sessions                          [x] Met [] Not met [] Partially met                2.  Patient will be able to use the right hand for light ADL tasks such as eating, dressing, grooming tasks                          [x] Met [] Not met [] Partially met                3.  Patient will be independent with scar and edema management techniques to promote increased hand use for daily activities                          [x] Met [] Not met [] Partially met          Long Term Goals:  To be accomplished in 12-16 treatments:              1.  Patient will be able to use scissors for ADL, IADL, and leisure activities                          [] Met [x] Not met [] Partially met                2.  Patient will be able to operate lawnmower with little to no difficulty                          [] Met [] Not met [] Partially met                3.  Patient will be able to resume artwork activities taking rest breaks as needed, with little to no difficulty                          [] Met [] Not met [x] Partially met                4.  Patient will be mod I with light housekeeping chores                          [x] Met [] Not met [] Partially met                5.  Patient will be independent with all BADLs                          [] Met [] Not met [x] Partially met      Recommendations:continue OT services per new script/as  noted below:    Problem List/Impairments: Pain effecting function, Decreased range of motion, Decreased strength, Edema effecting function, Decreased ADL/functional abilities , Decreased activity tolerance, and Decreased flexibility/joint mobility    Treatment Plan may include any combination of the following: Therapeutic exercise, Therapeutic activities, Physical agent/modality, Scar management, Splinting/orthoses, Patient education, and ADLs/IADLs    Frequency / Duration: Patient to be seen 1-2 times per week for 6-8 treatments:    MICHAEL Clancy 9/9/2022       Retain this original for your records. If you are unable to process this request in 24 hours,  please contact our office.   ________________________________________________________________________  NOTE TO PHYSICIAN:  Please complete the following and fax to: [address] [place] [address]:  Fax: [phone]     _____ I certify that the above Therapy Services are being furnished while the patient is under my care. I agree with the treatment plan and certify that this therapy is necessary. _____ I have read the above report and request that  my patient continue therapy   with the following changes/special instructions:     _____I have read the above report and request that my patient be discharged from therapy.      Physician's Signature:______________________________________________Date:___________________Time:_________________

## 2022-09-09 NOTE — PROGRESS NOTES
OT DAILY TREATMENT NOTE - [facility]     Patient Name: Homa Alves  Date:2022  : 1945  [x]  Patient  Verified  Payor: [place] MEDICARE / Plan: [place] MEDICARE PART A & B / Product Type: Medicare /    In time:205  Out time:308  Total Treatment Time (min): 63  Total Timed Codes (min): 55  1:1 Treatment Time ( [address] only): 54   Visit #: 6    Treatment Area: Right hand pain [M79.641]  Unilateral primary osteoarthritis of first carpometacarpal joint, right hand [M18.11]  Encounter for other specified surgical aftercare [Z48.89]    SUBJECTIVE  Pain Level in    (0-10 scale): 0  Pain Level out  (0-10 scale): 0         Any medication changes, allergies to medications, adverse drug reactions, diagnosis change, or new procedure performed?: [x] No    [] Yes (see summary sheet for update)  Subjective functional status/changes:   X changes reported:  --patient states the thumb spica splint fabricated at last session irritated her hand. She states she is not using it. Patient has applied a bandage (guaze pad, bandaid) around the thumb /in web space with IP free which she states is very comfortable.      OBJECTIVE    55 min Therapeutic Exercise:  [x] See flow sheet :   Rationale: increase ROM, increase strength, and improve coordination to improve the patients ability to resume daily activities using the R hand with minimal to no pain, difficulty    With   [x] TE   [] TA   [] neuro   [] other: Patient Education: [] Initiate HEP  [] Review HEP  [x] Progressed/Changed HEP based on: light strengthening using t-foam cube  [] positioning   [] body mechanics   [] transfers   [] heat/ice use   [x] Splint wear/positioning (thumb)   [] Sensory re-education   [] scar management      [] edema management   []  other:             Other Objective/Functional Measures:      Pinch Measurements: Taken with Pinch Gauge, in lbs     Date   Date   Date   3 pt         Right <1  see      Left  8.5 See       Lateral         Right N/t  3 Left   9      Tip         Right N/t  <0.5     Left   3.5             JEBSEN HAND FUNCTION TEST (time in seconds)     Hand / Date Right, 9/9/22      Writing 11      Cards 8      Small objects 6         ASSESSMENT/Changes in Function: Patient tolerance to treatment:  [] poor  [] fair  [x] good  []  excellent   Patient reports decreased pain/discomfort overall in the R hand. She continues to take an anti-inflammatory. Patient reports the thumb spica splint is irritating to her hand. Modified patient's bandaging for the R thumb-pt verbalized understanding. Progressed HEP to include light hand strengthening. Re-viewed several exercises for proper form/optimal performance  Patient will continue to benefit from skilled OT services to address Decreased range of motion, Decreased strength, Edema effecting function, Decreased coordination/prehension, Decreased ADL/functional abilities , Decreased activity tolerance, and Decreased flexibility/joint mobility  to attain remaining goals. []  See Plan of Care  [x]  See progress note/recertification  []  See Discharge Summary         Progress towards goals / Updated goals:  Short Term Goals: To be accomplished in 6-8 treatments:              1.  Patient will be independent with HEP to promote gains between sessions                          [x] Met [] Not met [] Partially met                2.  Patient will be able to use the right hand for light ADL tasks such as eating, dressing, grooming tasks                          [x] Met [] Not met [] Partially met                3.  Patient will be independent with scar and edema management techniques to promote increased hand use for daily activities                          [x] Met [] Not met [] Partially met          Long Term Goals:  To be accomplished in 12-16 treatments:              1.  Patient will be able to use scissors for ADL, IADL, and leisure activities                          [] Met [x] Not met [] Partially met 2.  Patient will be able to operate lawnmower with little to no difficulty                          [] Met [] Not met [] Partially met                3.  Patient will be able to resume artwork activities taking rest breaks as needed, with little to no difficulty                          [] Met [] Not met [] Partially met                4.  Patient will be mod I with light housekeeping chores                          [x] Met [] Not met [] Partially met                5.  Patient will be independent with all BADLs                          [] Met [] Not met [x] Partially met      PLAN  [x]  Upgrade activities as tolerated     [x]  Continue plan of care  []  Update interventions per flow sheet       []  Discharge due to:_  [x]  Other: [id number] protection education      [facility], OTR/L 9/9/2022

## 2022-09-16 ENCOUNTER — APPOINTMENT (OUTPATIENT)
Dept: PHYSICAL THERAPY | Age: 77
End: 2022-09-16
Payer: MEDICARE

## 2022-09-30 ENCOUNTER — HOSPITAL ENCOUNTER (OUTPATIENT)
Dept: PHYSICAL THERAPY | Age: 77
Discharge: HOME | End: 2022-09-30
Payer: MEDICARE

## 2022-09-30 PROCEDURE — 97110 THERAPEUTIC EXERCISES: CPT

## 2022-09-30 NOTE — PROGRESS NOTES
OT DAILY TREATMENT NOTE - [facility]     Patient Name: Jhon Silva  Date:2022  : 1945  [x]  Patient  Verified  Payor: VA MEDICARE / Plan: VA MEDICARE PART A & B / Product Type: Medicare /    In time:322  Out time:355  Total Treatment Time (min): 33  Total Timed Codes (min): 33  1:1 Treatment Time ( only): 33   Visit #: 7    Treatment Area: Right hand pain [M79.641]  Unilateral primary osteoarthritis of first carpometacarpal joint, right hand [M18.11]  Encounter for other specified surgical aftercare [Z48.89]    SUBJECTIVE  Pain Level in    (0-10 scale): 0  Pain Level out  (0-10 scale): 0         Any medication changes, allergies to medications, adverse drug reactions, diagnosis change, or new procedure performed?: [x] No    [] Yes (see summary sheet for update)  Subjective functional status/changes:   [x] No changes reported  --patient states she still feels a \"Pulling\" in the right forearm with \"certain\" movements  --patient states she wears the splint \"at times\" /as needed     14 WEEKS POST OP      OBJECTIVE    33 min Therapeutic Exercise:  [x] See flow sheet :   Rationale: increase ROM and increase strength to improve the patients ability to engage in daily activities with less pain and difficulty    With   [x] TE   [] TA   [] neuro   [] other: Patient Education: [] Initiate HEP  [x] Review HEP  [x] Progressed/Changed HEP based on: strengthening ex's   [] positioning   [] body mechanics   [] transfers   [] heat/ice use   [] Splint wear/care   [] Sensory re-education   [] scar management      [] edema management   []  other:       Objective data;          R :  45  L :  50        ASSESSMENT/Changes in Function: Patient tolerance to treatment:  [] poor  [x] fair  [] good  []  excellent   Patient states she still feels a \"pull\" in the right forearm with certain movements. Re-educated patient on composite UE flexion/extension stretches.   Progressed HeP to include RUE strengthening exercises. Patient's right hand  strength is now 45, up from 19 pounds. Patient to see MD for follow up in mid October--will notify therapist of date. Will see patient in 2 weeks . No adherence noted with either scar (thumb, forearm)  Patient will continue to benefit from skilled OT services to address Pain effecting function, Decreased range of motion, Decreased strength, Edema effecting function, Decreased ADL/functional abilities , Decreased activity tolerance, and Decreased flexibility/joint mobility  to attain remaining goals. [x]  See Plan of Care  []  See progress note/recertification  []  See Discharge Summary         Progress towards goals / Updated goals:  Short Term Goals: To be accomplished in 6-8 treatments:              1.  Patient will be independent with HEP to promote gains between sessions                          [x] Met [] Not met [] Partially met                2.  Patient will be able to use the right hand for light ADL tasks such as eating, dressing, grooming tasks                          [x] Met [] Not met [] Partially met                3.  Patient will be independent with scar and edema management techniques to promote increased hand use for daily activities                          [x] Met [] Not met [] Partially met          Long Term Goals:  To be accomplished in 12-16 treatments:              1.  Patient will be able to use scissors for ADL, IADL, and leisure activities                          [] Met [x] Not met [] Partially met                2.  Patient will be able to operate lawnmower with little to no difficulty                          [] Met [] Not met [] Partially met                3.  Patient will be able to resume artwork activities taking rest breaks as needed, with little to no difficulty                          [] Met [] Not met [x] Partially met                4.  Patient will be mod I with light housekeeping chores                          [x] Met [] Not met [] Partially met                5.  Patient will be independent with all BADLs                          [] Met [] Not met [x] Partially met      PLAN  [x]  Upgrade activities as tolerated     [x]  Continue plan of care  []  Update interventions per flow sheet       []  Discharge due to:_  [x]  Other: re-assess      Basil Ramos OTR/L 9/30/2022

## 2022-10-14 ENCOUNTER — APPOINTMENT (OUTPATIENT)
Dept: PHYSICAL THERAPY | Age: 77
End: 2022-10-14
Payer: MEDICARE

## 2022-10-20 ENCOUNTER — HOSPITAL ENCOUNTER (OUTPATIENT)
Dept: PHYSICAL THERAPY | Age: 77
Discharge: HOME | End: 2022-10-20
Payer: MEDICARE

## 2022-10-20 PROCEDURE — 97110 THERAPEUTIC EXERCISES: CPT

## 2022-10-20 NOTE — PROGRESS NOTES
OT DAILY TREATMENT NOTE - [facility]     Patient Name: Светлана Kumari  Date:10/20/2022  : 1945  [x]  Patient  Verified  Payor: VA MEDICARE / Plan: VA MEDICARE PART A & B / Product Type: Medicare /   In time:1040  Out time:1120  Total Treatment Time (min): 40  Total Timed Codes (min): 40  1:1 Treatment Time ( only): 40   Visit #: 8    Treatment Area: Right hand pain [M79.641]  Unilateral primary osteoarthritis of first carpometacarpal joint, right hand [M18.11]  Encounter for other specified surgical aftercare [Z48.89]    SUBJECTIVE  Pain Level in    (0-10 scale): 0  Pain Level out  (0-10 scale): 0         Any medication changes, allergies to medications, adverse drug reactions, diagnosis change, or new procedure performed?: [x] No    [] Yes (see summary sheet for update)  Subjective functional status/changes:   [x] No changes reported  -patient states she has resumed some of her artwork, doing \"most \" things.   Picking up heavy items is hard--uses 2 hands as needed        OBJECTIVE  40 min Therapeutic Exercise:  [x] See flow sheet :   Rationale: increase ROM and increase strength to improve the patients ability to engage in all activities as independently as possible      With   [x] TE   [] TA   [] neuro   [x] other: Patient Education: [] Initiate HEP  [x] Review HEP  [x] Progressed/Changed HEP based on: [facility] exercises  [] positioning   [] body mechanics   [] transfers   [] heat/ice use   [x] Splint wear/K tape   [] Sensory re-education   [] scar management      [] edema management   [x]  other: results of re-assessment            Other Objective/Functional Measures:    Pinch Measurements: Taken with Pinch Gauge, in lbs     Date   Date   Date  10/20   3 pt         Right <1  see   5   Left  8.5 See   8    Lateral         Right N/t  3  4   Left   9   9   Tip         Right N/t  <0.5  3   Left   3.5   4.5        Measurements: Taken with Kareem Dynamometer, in lbs   Level 2 DATE   DATE  9/30 DATE  10/20   Right 19 45  49    Left 45  50  50     ROM:                  Active Active   DATE:   7/19 7/19 8/16 10/20        Right Left Right right   Forearm Supination 77 76  WFL  WFL     Pronation [facility] WFL   Wrist Flex 68 76  69  69     Ext 67 80  66  73     Ulnar Dev 24 38  22  31     Radial Dev 11 28  13  26   thumb MP flex 25 65  35  20     IP flex 55 67  75  73     IP ext 0 0+  0  0       ASSESSMENT/Changes in Function: Patient tolerance to treatment:  [] poor  [] fair  [x] good  []  excellent   Patient not seen in 3 weeks. Re-assessment completed--see above for details. Right wrist extension as well as deviation has improved. Right pinch strength is  diminished but functional.  Right  is approximately equal to the left. Patient has met all goals at this time. Reviewed HEP, splint use. Discussed possible use of K tape to add support to the MCP joint. Patient verbalized understanding. Patient agreeable to discharge. []  See Plan of Care  []  See progress note/recertification  [x]  See Discharge Summary         Progress towards goals / Updated goals:  Short Term Goals: To be accomplished in 6-8 treatments:              1.  Patient will be independent with HEP to promote gains between sessions                          [x] Met [] Not met [] Partially met                2.  Patient will be able to use the right hand for light ADL tasks such as eating, dressing, grooming tasks                          [x] Met [] Not met [] Partially met                3.  Patient will be independent with scar and edema management techniques to promote increased hand use for daily activities                          [x] Met [] Not met [] Partially met          Long Term Goals:  To be accomplished in 12-16 treatments:              1.  Patient will be able to use scissors for ADL, IADL, and leisure activities (10/20)                          [x] Met [] Not met [] Partially met                2. Patient will be able to operate lawnmower with little to no difficulty(weed blower, vacuumed, cleaned garage)                          [x] Met [] Not met [] Partially met                3.  Patient will be able to resume artwork activities taking rest breaks as needed, with little to no difficulty (10/20)                          [x] Met [] Not met [] Partially met                4.  Patient will be mod I with light housekeeping chores                          [x] Met [] Not met [] Partially met                5.  Patient will be independent with all BADLs(10/20)                          [x] Met [] Not met [] Partially met         PLAN  [x]  Upgrade activities as tolerated     []  Continue plan of care  []  Update interventions per flow sheet       [x]  Discharge due to: goals met  []  Other:Yari Jackman OTR/OLGA 10/20/2022

## 2022-12-27 NOTE — PROGRESS NOTES
[address] [facility], [address] [place] [address]  Ph: [phone]  Fax: [phone]    Discharge Summary 2-15    Patient name: Kristina Verduzco  :   Provider#: [id number]  Referral source: Glorya Snellen, MD      Medical/Treatment Diagnosis: Right hand pain [M79.641]  Unilateral primary osteoarthritis of first carpometacarpal joint, right hand [M18.11]  Encounter for other specified surgical aftercare [Z48.89]     Prior Hospitalization: see medical history     Comorbidities: See Plan of Care  Prior Level of Function: See Plan of Care  Medications: Verified on Patient Summary List    Start of Care: 22  Onset Date:22  Visits from Start of Care: 8  Missed Visits: 3  Certification Period : 22 to 10/29/22    Assessment/Summary of care/GOALS:   Patient seen by Occupational therapy services for 8 visits following right [facility] arthroplasty in 2022. Last visit with occupational therapy: 10/20/22. Re-assessment completed--see below for details. Right wrist extension as well as deviation  improved. Right pinch strength was  diminished but functional.  Right  strength  approximately equal to  left hand  strength. Patient had met all goals at this time. Reviewed HEP and splint use. Discussed possible use of K tape to add support to the MCP joint during daily activities. Patient verbalized understanding. Patient discharged from therapy services.   Thank you for the referral.            Pinch Measurements: Taken with Pinch Gauge, in lbs     Date   Date   Date  10/20   3 pt         Right <1  see   5   Left  8.5 See   8    Lateral         Right N/t  3  4   Left   9   9   Tip         Right N/t  <0.5  3   Left   3.5   4.5        Measurements: Taken with Kareem Dynamometer, in lbs   Level 2 DATE   DATE   DATE  10/20   Right 19 45  49    Left 45  50  50      ROM:                        Active Active   DATE:    7/19 8/16 10/20        Right Left Right right   Forearm Supination 77 76  WFL  WFL     Pronation [facility]   Wrist Flex 68 76  69  69     Ext 67 80  66  73     Ulnar Dev 24 38  22  31     Radial Dev 11 28  13  26   thumb MP flex 25 65  35  20     IP flex 55 67  75  73     IP ext 0 0+  0  0       Short Term Goals: To be accomplished in 6-8 treatments:              1.  Patient will be independent with HEP to promote gains between sessions                          [x] Met [] Not met [] Partially met                2.  Patient will be able to use the right hand for light ADL tasks such as eating, dressing, grooming tasks                          [x] Met [] Not met [] Partially met                3.  Patient will be independent with scar and edema management techniques to promote increased hand use for daily activities                          [x] Met [] Not met [] Partially met          Long Term Goals:  To be accomplished in 12-16 treatments:              1.  Patient will be able to use scissors for ADL, IADL, and leisure activities (10/20)                          [x] Met [] Not met [] Partially met                2.  Patient will be able to operate lawnmower with little to no difficulty(weed blower, vacuumed, cleaned garage)                          [x] Met [] Not met [] Partially met                3.  Patient will be able to resume artwork activities taking rest breaks as needed, with little to no difficulty (10/20)                          [x] Met [] Not met [] Partially met                4.  Patient will be mod I with light housekeeping chores                          [x] Met [] Not met [] Partially met                5.  Patient will be independent with all BADLs(10/20)                          [x] Met [] Not met [] Partially met      [facility] [address] Score:  0 %    RECOMMENDATIONS:    [x]Discontinue therapy:   [x]Patient has reached set goals and is independent with HEP    Willacy Rachel, OTR/L 12/27/2022

## 2023-06-20 ENCOUNTER — HOSPITAL ENCOUNTER (OUTPATIENT)
Facility: HOSPITAL | Age: 78
Discharge: HOME | End: 2023-06-23
Attending: INTERNAL MEDICINE
Payer: MEDICARE

## 2023-06-20 DIAGNOSIS — R07.9 CHEST PAIN, UNSPECIFIED TYPE: ICD-10-CM

## 2023-06-20 DIAGNOSIS — R06.00 DYSPNEA, UNSPECIFIED TYPE: ICD-10-CM

## 2023-06-20 DIAGNOSIS — R06.02 SOB (SHORTNESS OF BREATH): ICD-10-CM

## 2023-06-20 PROCEDURE — 71250 CT THORAX DX C-: CPT

## 2023-12-30 ENCOUNTER — APPOINTMENT (OUTPATIENT)
Facility: HOSPITAL | Age: 78
End: 2023-12-30
Payer: MEDICARE

## 2023-12-30 ENCOUNTER — HOSPITAL ENCOUNTER (EMERGENCY)
Facility: HOSPITAL | Age: 78
Discharge: HOME | End: 2023-12-30
Attending: STUDENT IN AN ORGANIZED HEALTH CARE EDUCATION/TRAINING PROGRAM
Payer: MEDICARE

## 2023-12-30 VITALS
WEIGHT: 117 LBS | DIASTOLIC BLOOD PRESSURE: 73 MMHG | BODY MASS INDEX: 22.97 KG/M2 | HEART RATE: 73 BPM | SYSTOLIC BLOOD PRESSURE: 141 MMHG | TEMPERATURE: 99.5 F | OXYGEN SATURATION: 95 % | HEIGHT: 60 IN | RESPIRATION RATE: 16 BRPM

## 2023-12-30 DIAGNOSIS — J40 BRONCHITIS: Primary | ICD-10-CM

## 2023-12-30 LAB
FLUAV AG NPH QL IA: NEGATIVE
FLUBV AG NOSE QL IA: NEGATIVE

## 2023-12-30 PROCEDURE — 99284 EMERGENCY DEPT VISIT MOD MDM: CPT

## 2023-12-30 PROCEDURE — 71046 X-RAY EXAM CHEST 2 VIEWS: CPT

## 2023-12-30 PROCEDURE — 87804 INFLUENZA ASSAY W/OPTIC: CPT

## 2023-12-30 RX ORDER — GUAIFENESIN/DEXTROMETHORPHAN 100-10MG/5
5 SYRUP ORAL 3 TIMES DAILY PRN
Qty: 120 ML | Refills: 0 | Status: SHIPPED | OUTPATIENT
Start: 2023-12-30 | End: 2024-01-09

## 2023-12-30 RX ORDER — CODEINE PHOSPHATE AND GUAIFENESIN 10; 100 MG/5ML; MG/5ML
5 SOLUTION ORAL 3 TIMES DAILY PRN
Qty: 100 ML | Refills: 0 | Status: SHIPPED | OUTPATIENT
Start: 2023-12-30 | End: 2023-12-30

## 2023-12-30 RX ORDER — FLUTICASONE PROPIONATE 50 MCG
1 SPRAY, SUSPENSION (ML) NASAL DAILY
Qty: 16 G | Refills: 3 | Status: SHIPPED | OUTPATIENT
Start: 2023-12-30

## 2023-12-30 ASSESSMENT — PAIN SCALES - GENERAL: PAINLEVEL_OUTOF10: 8

## 2023-12-30 ASSESSMENT — PAIN - FUNCTIONAL ASSESSMENT: PAIN_FUNCTIONAL_ASSESSMENT: 0-10

## 2023-12-30 ASSESSMENT — PAIN DESCRIPTION - ORIENTATION: ORIENTATION: RIGHT;LEFT

## 2023-12-30 ASSESSMENT — PAIN DESCRIPTION - LOCATION: LOCATION: HEAD;EAR

## 2023-12-30 NOTE — ED PROVIDER NOTES
[facility] EMERGENCY DEPARTMENT  EMERGENCY DEPARTMENT ENCOUNTER       Pt Name: Nakia Schulz  MRN: [id number]  Birthdate 1945  Date of evaluation: 12/30/2023  Provider: Som Art MD   PCP: Zulma Guardado MD  Note Started: 12:58 PM EST 12/30/23    CHIEF COMPLAINT       Chief Complaint   Patient presents with    URI        HISTORY OF PRESENT ILLNESS: 1 or more elements      History From: Patient     Nakia Schulz is a 78 y.o. female presents to the emergency department for evaluation of headaches, body aches, chills, cough for 2 days.  Patient denies any shortness of breath denies any fevers.  States cough productive of yellow yellowish-brown sputum.  Denies any abdominal pain nausea or vomiting denies any chest pain.  Has been taking Tylenol with minimal relief     Nursing Notes were all reviewed and agreed with or any disagreements were addressed in the HPI.  Patient denies any SOB, Chest pain or neurological deficits.      REVIEW OF SYSTEMS      Review of Systems     Positives and Pertinent negatives as per HPI.    PAST HISTORY     Past Medical History:  Past Medical History:   Diagnosis Date    Cervicalgia 8/29/2017    GERD (gastroesophageal reflux disease)     Headache     Hyperlipidemia     Neuropathy     Radiculopathy 8/29/2017    From cervical myofacial pain       Past Surgical History:  History reviewed. No pertinent surgical history.    Family History:  History reviewed. No pertinent family history.    Social History:  Social History     Tobacco Use    Smoking status: Never       Allergies:  Allergies   Allergen Reactions    Tetanus Toxoids Swelling     \"Whole arm swelled up\". Reaction happened years ago per patient.        CURRENT MEDICATIONS      Previous Medications    ASPIRIN 81 MG EC TABLET    Take 81 mg by mouth every evening    BUTALBITAL-ACETAMINOPHEN-CAFFEINE (FIORICET, ESGIC) -40 MG PER TABLET    Take 1 tablet by mouth every 6 hours as needed    ESCITALOPRAM (LEXAPRO) 5 MG TABLET     excuse any errors that have escaped final proofreading.)

## 2024-04-12 ENCOUNTER — HOSPITAL ENCOUNTER (EMERGENCY)
Facility: HOSPITAL | Age: 79
Discharge: HOME | End: 2024-04-12
Attending: STUDENT IN AN ORGANIZED HEALTH CARE EDUCATION/TRAINING PROGRAM
Payer: MEDICARE

## 2024-04-12 ENCOUNTER — APPOINTMENT (OUTPATIENT)
Facility: HOSPITAL | Age: 79
End: 2024-04-12
Payer: MEDICARE

## 2024-04-12 VITALS
DIASTOLIC BLOOD PRESSURE: 121 MMHG | SYSTOLIC BLOOD PRESSURE: 159 MMHG | RESPIRATION RATE: 12 BRPM | WEIGHT: 117 LBS | HEART RATE: 61 BPM | TEMPERATURE: 97.9 F | HEIGHT: 60 IN | BODY MASS INDEX: 22.97 KG/M2 | OXYGEN SATURATION: 97 %

## 2024-04-12 DIAGNOSIS — R07.9 ACUTE CHEST PAIN: Primary | ICD-10-CM

## 2024-04-12 LAB
ALBUMIN SERPL-MCNC: 4 G/DL (ref 3.5–5)
ALBUMIN/GLOB SERPL: 1.1 (ref 1.1–2.2)
ALP SERPL-CCNC: 77 U/L (ref 45–117)
ALT SERPL-CCNC: 15 U/L (ref 12–78)
ANION GAP SERPL CALC-SCNC: 4 MMOL/L (ref 5–15)
AST SERPL W P-5'-P-CCNC: 17 U/L (ref 15–37)
BASOPHILS # BLD: 0.1 K/UL (ref 0–0.1)
BASOPHILS NFR BLD: 1 % (ref 0–1)
BILIRUB SERPL-MCNC: 0.5 MG/DL (ref 0.2–1)
BUN SERPL-MCNC: 12 MG/DL (ref 6–20)
BUN/CREAT SERPL: 13 (ref 12–20)
CA-I BLD-MCNC: 9.4 MG/DL (ref 8.5–10.1)
CHLORIDE SERPL-SCNC: 102 MMOL/L (ref 97–108)
CO2 SERPL-SCNC: 29 MMOL/L (ref 21–32)
CREAT SERPL-MCNC: 0.9 MG/DL (ref 0.55–1.02)
DIFFERENTIAL METHOD BLD: NORMAL
EOSINOPHIL # BLD: 0.2 K/UL (ref 0–0.4)
EOSINOPHIL NFR BLD: 3 % (ref 0–7)
ERYTHROCYTE [DISTWIDTH] IN BLOOD BY AUTOMATED COUNT: 13.9 % (ref 11.5–14.5)
GLOBULIN SER CALC-MCNC: 3.5 G/DL (ref 2–4)
GLUCOSE SERPL-MCNC: 87 MG/DL (ref 65–100)
HCT VFR BLD AUTO: 41.3 % (ref 35–47)
HGB BLD-MCNC: 13.3 G/DL (ref 11.5–16)
IMM GRANULOCYTES # BLD AUTO: 0 K/UL (ref 0–0.04)
IMM GRANULOCYTES NFR BLD AUTO: 0 % (ref 0–0.5)
LYMPHOCYTES # BLD: 2.1 K/UL (ref 0.8–3.5)
LYMPHOCYTES NFR BLD: 28 % (ref 12–49)
MCH RBC QN AUTO: 30 PG (ref 26–34)
MCHC RBC AUTO-ENTMCNC: 32.2 G/DL (ref 30–36.5)
MCV RBC AUTO: 93 FL (ref 80–99)
MONOCYTES # BLD: 0.6 K/UL (ref 0–1)
MONOCYTES NFR BLD: 8 % (ref 5–13)
NEUTS SEG # BLD: 4.4 K/UL (ref 1.8–8)
NEUTS SEG NFR BLD: 60 % (ref 32–75)
NRBC # BLD: 0 K/UL (ref 0–0.01)
NRBC BLD-RTO: 0 PER 100 WBC
PLATELET # BLD AUTO: 196 K/UL (ref 150–400)
PMV BLD AUTO: 12.2 FL (ref 8.9–12.9)
POTASSIUM SERPL-SCNC: 3.9 MMOL/L (ref 3.5–5.1)
PROT SERPL-MCNC: 7.5 G/DL (ref 6.4–8.2)
RBC # BLD AUTO: 4.44 M/UL (ref 3.8–5.2)
SODIUM SERPL-SCNC: 135 MMOL/L (ref 136–145)
TROPONIN I SERPL HS-MCNC: 6 NG/L (ref 0–51)
TROPONIN I SERPL HS-MCNC: 6 NG/L (ref 0–51)
WBC # BLD AUTO: 7.4 K/UL (ref 3.6–11)

## 2024-04-12 PROCEDURE — 99285 EMERGENCY DEPT VISIT HI MDM: CPT

## 2024-04-12 PROCEDURE — 80053 COMPREHEN METABOLIC PANEL: CPT

## 2024-04-12 PROCEDURE — 84484 ASSAY OF TROPONIN QUANT: CPT

## 2024-04-12 PROCEDURE — 36415 COLL VENOUS BLD VENIPUNCTURE: CPT

## 2024-04-12 PROCEDURE — 71046 X-RAY EXAM CHEST 2 VIEWS: CPT

## 2024-04-12 PROCEDURE — 85025 COMPLETE CBC W/AUTO DIFF WBC: CPT

## 2024-04-12 PROCEDURE — 94761 N-INVAS EAR/PLS OXIMETRY MLT: CPT

## 2024-04-12 PROCEDURE — 93005 ELECTROCARDIOGRAM TRACING: CPT | Performed by: STUDENT IN AN ORGANIZED HEALTH CARE EDUCATION/TRAINING PROGRAM

## 2024-04-12 ASSESSMENT — LIFESTYLE VARIABLES
HOW MANY STANDARD DRINKS CONTAINING ALCOHOL DO YOU HAVE ON A TYPICAL DAY: PATIENT DOES NOT DRINK
HOW OFTEN DO YOU HAVE A DRINK CONTAINING ALCOHOL: NEVER

## 2024-04-12 ASSESSMENT — PAIN DESCRIPTION - LOCATION: LOCATION: SHOULDER

## 2024-04-12 ASSESSMENT — PAIN SCALES - GENERAL: PAINLEVEL_OUTOF10: 7

## 2024-04-12 ASSESSMENT — PAIN - FUNCTIONAL ASSESSMENT: PAIN_FUNCTIONAL_ASSESSMENT: 0-10

## 2024-04-12 NOTE — ED TRIAGE NOTES
Pt c/o chest pain, back pain, and left shoulder pain that started last night. Pt reports having flutters before.denies n/v.

## 2024-04-13 LAB
EKG ATRIAL RATE: 61 BPM
EKG DIAGNOSIS: NORMAL
EKG P AXIS: 75 DEGREES
EKG P-R INTERVAL: 146 MS
EKG Q-T INTERVAL: 440 MS
EKG QRS DURATION: 76 MS
EKG QTC CALCULATION (BAZETT): 442 MS
EKG R AXIS: 32 DEGREES
EKG T AXIS: 56 DEGREES
EKG VENTRICULAR RATE: 61 BPM

## 2024-04-13 NOTE — ED PROVIDER NOTES
[facility] EMERGENCY DEPT  EMERGENCY DEPARTMENT HISTORY AND PHYSICAL EXAM      Date: 4/12/2024  Patient Name: Nakia Schulz  MRN: [id number]  Birthdate 1945  Date of evaluation: 4/12/2024  Provider: Matt Quan MD   Note Started: 9:47 PM EDT 4/12/24    HISTORY OF PRESENT ILLNESS     Chief Complaint   Patient presents with    Chest Pain    Back Pain       History Provided By: Patient    HPI: Nakia Schulz is a 78 y.o. female ***    PAST MEDICAL HISTORY   Past Medical History:  Past Medical History:   Diagnosis Date    Cervicalgia 8/29/2017    GERD (gastroesophageal reflux disease)     Headache     Hyperlipidemia     Neuropathy     Radiculopathy 8/29/2017    From cervical myofacial pain       Past Surgical History:  No past surgical history on file.    Family History:  No family history on file.    Social History:  Social History     Tobacco Use    Smoking status: Never       Allergies:  Allergies   Allergen Reactions    Codeine Hallucinations    Tetanus Toxoids Swelling     \"Whole arm swelled up\". Reaction happened years ago per patient.        PCP: Zulma Guardado MD    Current Meds:   No current facility-administered medications for this encounter.     Current Outpatient Medications   Medication Sig Dispense Refill    fluticasone (FLONASE) 50 MCG/ACT nasal spray 1 spray by Each Nostril route daily 16 g 3    aspirin 81 MG EC tablet Take 81 mg by mouth every evening      butalbital-acetaminophen-caffeine (FIORICET, ESGIC) -40 MG per tablet Take 1 tablet by mouth every 6 hours as needed      escitalopram (LEXAPRO) 5 MG tablet Take 5 mg by mouth every evening      gabapentin (NEURONTIN) 300 MG capsule Take 300 mg by mouth every evening.      omeprazole (PRILOSEC) 20 MG delayed release capsule Take 20 mg by mouth every evening      predniSONE (DELTASONE) 10 MG tablet Take 6 tabs for 1 day, then 5 tabs for 1 day, then 4 tabs for 1 day, then 3 tabs for 1 day, then 2 tabs for 1 day, then 1 tab for 1 day.

## 2024-04-13 NOTE — ED NOTES
Provider has reviewed the patient, discharge plan discussed and patient verbalized understanding. VS rechecked IV line removed.     Discharged patient accordingly accompanied by neighbor. Wheeled patient  outside ER to her car.

## 2024-04-15 ASSESSMENT — HEART SCORE: ECG: NORMAL

## 2025-03-28 ENCOUNTER — HOSPITAL ENCOUNTER (EMERGENCY)
Facility: HOSPITAL | Age: 80
Discharge: HOME | End: 2025-03-28
Attending: EMERGENCY MEDICINE
Payer: MEDICARE

## 2025-03-28 ENCOUNTER — APPOINTMENT (OUTPATIENT)
Facility: HOSPITAL | Age: 80
End: 2025-03-28
Payer: MEDICARE

## 2025-03-28 VITALS
WEIGHT: 110 LBS | SYSTOLIC BLOOD PRESSURE: 161 MMHG | BODY MASS INDEX: 21.6 KG/M2 | HEIGHT: 60 IN | HEART RATE: 60 BPM | OXYGEN SATURATION: 96 % | TEMPERATURE: 97.9 F | DIASTOLIC BLOOD PRESSURE: 73 MMHG | RESPIRATION RATE: 17 BRPM

## 2025-03-28 DIAGNOSIS — I10 HYPERTENSION, UNSPECIFIED TYPE: ICD-10-CM

## 2025-03-28 DIAGNOSIS — R07.9 CHEST PAIN, UNSPECIFIED TYPE: Primary | ICD-10-CM

## 2025-03-28 DIAGNOSIS — R51.9 ACUTE NONINTRACTABLE HEADACHE, UNSPECIFIED HEADACHE TYPE: ICD-10-CM

## 2025-03-28 LAB
ALBUMIN SERPL-MCNC: 4.1 G/DL (ref 3.5–5)
ALBUMIN/GLOB SERPL: 1.1 (ref 1.1–2.2)
ALP SERPL-CCNC: 70 U/L (ref 45–117)
ALT SERPL-CCNC: 19 U/L (ref 12–78)
AMPHET UR QL SCN: NEGATIVE
ANION GAP SERPL CALC-SCNC: 3 MMOL/L (ref 2–12)
AST SERPL W P-5'-P-CCNC: 18 U/L (ref 15–37)
BARBITURATES UR QL SCN: NEGATIVE
BASOPHILS # BLD: 0.07 K/UL (ref 0–0.1)
BASOPHILS NFR BLD: 0.7 % (ref 0–1)
BENZODIAZ UR QL: NEGATIVE
BILIRUB SERPL-MCNC: 0.5 MG/DL (ref 0.2–1)
BUN SERPL-MCNC: 10 MG/DL (ref 6–20)
BUN/CREAT SERPL: 9 (ref 12–20)
CA-I BLD-MCNC: 9.1 MG/DL (ref 8.5–10.1)
CANNABINOIDS UR QL SCN: NEGATIVE
CHLORIDE SERPL-SCNC: 106 MMOL/L (ref 97–108)
CO2 SERPL-SCNC: 29 MMOL/L (ref 21–32)
COCAINE UR QL SCN: NEGATIVE
CREAT SERPL-MCNC: 1.16 MG/DL (ref 0.55–1.02)
DIFFERENTIAL METHOD BLD: NORMAL
EKG ATRIAL RATE: 69 BPM
EKG DIAGNOSIS: NORMAL
EKG P AXIS: 67 DEGREES
EKG P-R INTERVAL: 158 MS
EKG Q-T INTERVAL: 434 MS
EKG QRS DURATION: 70 MS
EKG QTC CALCULATION (BAZETT): 465 MS
EKG R AXIS: 30 DEGREES
EKG T AXIS: 29 DEGREES
EKG VENTRICULAR RATE: 69 BPM
EOSINOPHIL # BLD: 0.26 K/UL (ref 0–0.4)
EOSINOPHIL NFR BLD: 2.6 % (ref 0–7)
ERYTHROCYTE [DISTWIDTH] IN BLOOD BY AUTOMATED COUNT: 13.2 % (ref 11.5–14.5)
ETHANOL SERPL-MCNC: <10 MG/DL (ref 0–0.08)
GLOBULIN SER CALC-MCNC: 3.6 G/DL (ref 2–4)
GLUCOSE SERPL-MCNC: 91 MG/DL (ref 65–100)
HCT VFR BLD AUTO: 37.9 % (ref 35–47)
HGB BLD-MCNC: 12.7 G/DL (ref 11.5–16)
IMM GRANULOCYTES # BLD AUTO: 0.03 K/UL (ref 0–0.04)
IMM GRANULOCYTES NFR BLD AUTO: 0.3 % (ref 0–0.5)
LYMPHOCYTES # BLD: 3.34 K/UL (ref 0.8–3.5)
LYMPHOCYTES NFR BLD: 33.2 % (ref 12–49)
Lab: NORMAL
MCH RBC QN AUTO: 30.5 PG (ref 26–34)
MCHC RBC AUTO-ENTMCNC: 33.5 G/DL (ref 30–36.5)
MCV RBC AUTO: 91.1 FL (ref 80–99)
METHADONE UR QL: NEGATIVE
MONOCYTES # BLD: 0.7 K/UL (ref 0–1)
MONOCYTES NFR BLD: 7 % (ref 5–13)
NEUTS SEG # BLD: 5.66 K/UL (ref 1.8–8)
NEUTS SEG NFR BLD: 56.2 % (ref 32–75)
NRBC # BLD: 0 K/UL (ref 0–0.01)
NRBC BLD-RTO: 0 PER 100 WBC
OPIATES UR QL: NEGATIVE
PCP UR QL: NEGATIVE
PLATELET # BLD AUTO: 191 K/UL (ref 150–400)
PMV BLD AUTO: 12.3 FL (ref 8.9–12.9)
POTASSIUM SERPL-SCNC: 3.7 MMOL/L (ref 3.5–5.1)
PROT SERPL-MCNC: 7.7 G/DL (ref 6.4–8.2)
RBC # BLD AUTO: 4.16 M/UL (ref 3.8–5.2)
SODIUM SERPL-SCNC: 138 MMOL/L (ref 136–145)
TROPONIN I SERPL HS-MCNC: 11 NG/L (ref 0–51)
TROPONIN I SERPL HS-MCNC: 12 NG/L (ref 0–51)
WBC # BLD AUTO: 10.1 K/UL (ref 3.6–11)

## 2025-03-28 PROCEDURE — 85025 COMPLETE CBC W/AUTO DIFF WBC: CPT

## 2025-03-28 PROCEDURE — 36415 COLL VENOUS BLD VENIPUNCTURE: CPT

## 2025-03-28 PROCEDURE — 96372 THER/PROPH/DIAG INJ SC/IM: CPT

## 2025-03-28 PROCEDURE — 99285 EMERGENCY DEPT VISIT HI MDM: CPT

## 2025-03-28 PROCEDURE — 70450 CT HEAD/BRAIN W/O DYE: CPT

## 2025-03-28 PROCEDURE — 6360000004 HC RX CONTRAST MEDICATION: Performed by: EMERGENCY MEDICINE

## 2025-03-28 PROCEDURE — 84484 ASSAY OF TROPONIN QUANT: CPT

## 2025-03-28 PROCEDURE — 93005 ELECTROCARDIOGRAM TRACING: CPT | Performed by: EMERGENCY MEDICINE

## 2025-03-28 PROCEDURE — 82077 ASSAY SPEC XCP UR&BREATH IA: CPT

## 2025-03-28 PROCEDURE — 71275 CT ANGIOGRAPHY CHEST: CPT

## 2025-03-28 PROCEDURE — 6360000002 HC RX W HCPCS: Performed by: EMERGENCY MEDICINE

## 2025-03-28 PROCEDURE — 80053 COMPREHEN METABOLIC PANEL: CPT

## 2025-03-28 PROCEDURE — 80307 DRUG TEST PRSMV CHEM ANLYZR: CPT

## 2025-03-28 RX ORDER — BUTALBITAL, ACETAMINOPHEN AND CAFFEINE 50; 325; 40 MG/1; MG/1; MG/1
1 TABLET ORAL
Status: DISCONTINUED | OUTPATIENT
Start: 2025-03-28 | End: 2025-03-28 | Stop reason: HOSPADM

## 2025-03-28 RX ORDER — HALOPERIDOL 5 MG/ML
5 INJECTION INTRAMUSCULAR
Status: COMPLETED | OUTPATIENT
Start: 2025-03-28 | End: 2025-03-28

## 2025-03-28 RX ORDER — IOPAMIDOL 755 MG/ML
100 INJECTION, SOLUTION INTRAVASCULAR
Status: COMPLETED | OUTPATIENT
Start: 2025-03-28 | End: 2025-03-28

## 2025-03-28 RX ADMIN — IOPAMIDOL 100 ML: 755 INJECTION, SOLUTION INTRAVENOUS at 04:00

## 2025-03-28 RX ADMIN — HALOPERIDOL LACTATE 5 MG: 5 INJECTION, SOLUTION INTRAMUSCULAR at 01:16

## 2025-03-28 ASSESSMENT — PAIN - FUNCTIONAL ASSESSMENT: PAIN_FUNCTIONAL_ASSESSMENT: NONE - DENIES PAIN

## 2025-03-28 NOTE — DISCHARGE INSTRUCTIONS
Thank you for choosing our Emergency Department for your care.  It is our privilege to care for you in your time of need.  In the next several days, you may receive a survey via email or mailed to your home about your experience with our team.  We would greatly appreciate you taking a few minutes to complete the survey, as we use this information to learn what we have done well and what we could be doing better. Thank you for trusting us with your care!    Below you will find a list of your tests from today's visit.   Labs and Radiology Studies  Recent Results (from the past 12 hours)   CBC with Auto Differential    Collection Time: 03/28/25 12:35 AM   Result Value Ref Range    WBC 10.1 3.6 - 11.0 K/uL    RBC 4.16 3.80 - 5.20 M/uL    Hemoglobin 12.7 11.5 - 16.0 g/dL    Hematocrit 37.9 35.0 - 47.0 %    MCV 91.1 80.0 - 99.0 FL    MCH 30.5 26.0 - 34.0 PG    MCHC 33.5 30.0 - 36.5 g/dL    RDW 13.2 11.5 - 14.5 %    Platelets 191 150 - 400 K/uL    MPV 12.3 8.9 - 12.9 FL    Nucleated RBCs 0.0 0.0  WBC    nRBC 0.00 0.00 - 0.01 K/uL    Neutrophils % 56.2 32.0 - 75.0 %    Lymphocytes % 33.2 12.0 - 49.0 %    Monocytes % 7.0 5.0 - 13.0 %    Eosinophils % 2.6 0.0 - 7.0 %    Basophils % 0.7 0.0 - 1.0 %    Immature Granulocytes % 0.3 0 - 0.5 %    Neutrophils Absolute 5.66 1.80 - 8.00 K/UL    Lymphocytes Absolute 3.34 0.80 - 3.50 K/UL    Monocytes Absolute 0.70 0.00 - 1.00 K/UL    Eosinophils Absolute 0.26 0.00 - 0.40 K/UL    Basophils Absolute 0.07 0.00 - 0.10 K/UL    Immature Granulocytes Absolute 0.03 0.00 - 0.04 K/UL    Differential Type AUTOMATED     Comprehensive Metabolic Panel    Collection Time: 03/28/25 12:35 AM   Result Value Ref Range    Sodium 138 136 - 145 mmol/L    Potassium 3.7 3.5 - 5.1 mmol/L    Chloride 106 97 - 108 mmol/L    CO2 29 21 - 32 mmol/L    Anion Gap 3 2 - 12 mmol/L    Glucose 91 65 - 100 mg/dL    BUN 10 6 - 20 mg/dL    Creatinine 1.16 (H) 0.55 - 1.02 mg/dL    BUN/Creatinine Ratio 9 (L) 12 -

## 2025-03-28 NOTE — ED PROVIDER NOTES
[facility] EMERGENCY DEPT  EMERGENCY DEPARTMENT HISTORY AND PHYSICAL EXAM      Date: 3/28/2025  Patient Name: Nakia Schulz  MRN: [id number]  YOB: 1945  Date of evaluation: 3/28/2025  Provider: Laurel Pace MD   Note Started: 1:01 AM EDT 3/28/25    HISTORY OF PRESENT ILLNESS     Chief Complaint   Patient presents with    Chest Pain    Headache    Abdominal Pain       History Provided By: Patient    HPI: Nakia Schulz is a 79-year-old female with history of hyperlipidemia here with complaint of headache.  Patient states she was having a severe headache today.  She checked her blood pressure and it was over 200 systolic.  Patient states recently she was at another hospital for similar complaint of elevated blood pressure and she was never seen or examined but had a CT of the abdomen which showed a AAA and was discharged.  States she also saw her primary care doctor and was started on blood pressure medication.  After 3 days her blood pressure improved and she called her doctor and told her she was stopping the medication.  She states normally her blood pressure is normal.  Also reports occasional chest pain.     PAST MEDICAL HISTORY   Past Medical History:  Past Medical History:   Diagnosis Date    Cervicalgia 8/29/2017    GERD (gastroesophageal reflux disease)     Headache     Hyperlipidemia     Neuropathy     Radiculopathy 8/29/2017    From cervical myofacial pain       Past Surgical History:  No past surgical history on file.    Family History:  No family history on file.    Social History:  Social History     Tobacco Use    Smoking status: Never       Allergies:  Allergies   Allergen Reactions    Codeine Hallucinations    Tetanus Toxoids Swelling     \"Whole arm swelled up\". Reaction happened years ago per patient.        PCP: Zulma Guardado MD    Current Meds:   Current Facility-Administered Medications   Medication Dose Route Frequency Provider Last Rate Last Admin

## 2025-03-28 NOTE — ED TRIAGE NOTES
BIB EMS for headache, hypertension SBP over 200 that started at 10pm last night. Also c/o of chest pain and lower abdominal upon EMS arrival. En route, pt still c/o on and off chest pain with involuntary twitching ofextremities (does not look like seizure). Denies chest pain upon arrival in ED, denies etoh, no ASA given, per EMS was not able to get BGL due to glucometer problem      PMH: HTN